# Patient Record
Sex: MALE
[De-identification: names, ages, dates, MRNs, and addresses within clinical notes are randomized per-mention and may not be internally consistent; named-entity substitution may affect disease eponyms.]

---

## 2017-03-19 ENCOUNTER — HOSPITAL ENCOUNTER (EMERGENCY)
Dept: HOSPITAL 31 - C.ER | Age: 32
LOS: 1 days | Discharge: HOME | End: 2017-03-20
Payer: SELF-PAY

## 2017-03-19 VITALS — RESPIRATION RATE: 18 BRPM | TEMPERATURE: 98.2 F

## 2017-03-19 DIAGNOSIS — F19.10: Primary | ICD-10-CM

## 2017-03-19 PROCEDURE — 99285 EMERGENCY DEPT VISIT HI MDM: CPT

## 2017-03-19 PROCEDURE — 85025 COMPLETE CBC W/AUTO DIFF WBC: CPT

## 2017-03-19 PROCEDURE — 81001 URINALYSIS AUTO W/SCOPE: CPT

## 2017-03-19 PROCEDURE — 80053 COMPREHEN METABOLIC PANEL: CPT

## 2017-03-20 VITALS — OXYGEN SATURATION: 95 % | HEART RATE: 73 BPM | DIASTOLIC BLOOD PRESSURE: 77 MMHG | SYSTOLIC BLOOD PRESSURE: 121 MMHG

## 2017-03-20 LAB
ALBUMIN/GLOB SERPL: 1.2 {RATIO} (ref 1–2.1)
ALP SERPL-CCNC: 65 U/L (ref 38–126)
ALT SERPL-CCNC: 23 U/L (ref 21–72)
AST SERPL-CCNC: 27 U/L (ref 17–59)
BASOPHILS # BLD AUTO: 0.4 K/UL (ref 0–0.2)
BASOPHILS NFR BLD: 2.5 % (ref 0–2)
BILIRUB SERPL-MCNC: 0.5 MG/DL (ref 0.2–1.3)
BILIRUB UR-MCNC: NEGATIVE MG/DL
BUN SERPL-MCNC: 16 MG/DL (ref 9–20)
CALCIUM SERPL-MCNC: 8.4 MG/DL (ref 8.6–10.4)
CHLORIDE SERPL-SCNC: 94 MMOL/L (ref 98–107)
CO2 SERPL-SCNC: 26 MMOL/L (ref 22–30)
EOSINOPHIL # BLD AUTO: 0.1 K/UL (ref 0–0.7)
EOSINOPHIL NFR BLD: 0.6 % (ref 0–4)
EOSINOPHIL NFR BLD: 1 % (ref 0–4)
ERYTHROCYTE [DISTWIDTH] IN BLOOD BY AUTOMATED COUNT: 13.1 % (ref 11.5–14.5)
ETHANOL SERPL-MCNC: < 10 MG/DL (ref 0–10)
GLOBULIN SER-MCNC: 3.4 GM/DL (ref 2.2–3.9)
GLUCOSE SERPL-MCNC: 111 MG/DL (ref 75–110)
GLUCOSE UR STRIP-MCNC: NORMAL MG/DL
HCT VFR BLD CALC: 37.4 % (ref 35–51)
KETONES UR STRIP-MCNC: (no result) MG/DL
LEUKOCYTE ESTERASE UR-ACNC: (no result) LEU/UL
LYMPHOCYTES # BLD AUTO: 0.8 K/UL (ref 1–4.3)
LYMPHOCYTES NFR BLD AUTO: 5.6 % (ref 20–40)
MCH RBC QN AUTO: 30.5 PG (ref 27–31)
MCHC RBC AUTO-ENTMCNC: 34.5 G/DL (ref 33–37)
MCV RBC AUTO: 88.4 FL (ref 80–94)
MONOCYTES # BLD: 1.4 K/UL (ref 0–0.8)
MONOCYTES NFR BLD: 9 % (ref 0–10)
NEUTROPHILS NFR BLD AUTO: 77 % (ref 50–75)
NRBC BLD AUTO-RTO: 0.1 % (ref 0–2)
PH UR STRIP: 6 [PH] (ref 5–8)
PLATELET # BLD: 249 K/UL (ref 130–400)
PMV BLD AUTO: 8 FL (ref 7.2–11.7)
POTASSIUM SERPL-SCNC: 3.5 MMOL/L (ref 3.6–5.2)
PROT SERPL-MCNC: 7.6 G/DL (ref 6.3–8.3)
PROT UR STRIP-MCNC: NEGATIVE MG/DL
RBC # UR STRIP: NEGATIVE /UL
RBC #/AREA URNS HPF: 2 /HPF (ref 0–3)
SODIUM SERPL-SCNC: 135 MMOL/L (ref 132–148)
SP GR UR STRIP: 1.01 (ref 1–1.03)
TOTAL CELLS COUNTED BLD: 100
UROBILINOGEN UR-MCNC: NORMAL MG/DL (ref 0.2–1)
WBC # BLD AUTO: 15 K/UL (ref 4.8–10.8)
WBC #/AREA URNS HPF: 3 /HPF (ref 0–5)

## 2017-12-20 ENCOUNTER — EMERGENCY (EMERGENCY)
Facility: HOSPITAL | Age: 32
LOS: 1 days | Discharge: HOME | End: 2017-12-20
Attending: EMERGENCY MEDICINE | Admitting: EMERGENCY MEDICINE
Payer: MEDICAID

## 2017-12-20 VITALS
TEMPERATURE: 99 F | DIASTOLIC BLOOD PRESSURE: 81 MMHG | RESPIRATION RATE: 20 BRPM | SYSTOLIC BLOOD PRESSURE: 118 MMHG | OXYGEN SATURATION: 100 % | HEART RATE: 130 BPM

## 2017-12-20 DIAGNOSIS — Z59.0 HOMELESSNESS: ICD-10-CM

## 2017-12-20 DIAGNOSIS — M25.562 PAIN IN LEFT KNEE: ICD-10-CM
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
## 2017-12-20 DIAGNOSIS — R05 COUGH: ICD-10-CM

## 2017-12-20 DIAGNOSIS — S82.142A DISPLACED BICONDYLAR FRACTURE OF LEFT TIBIA, INITIAL ENCOUNTER FOR CLOSED FRACTURE: ICD-10-CM
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
## 2017-12-20 DIAGNOSIS — R07.89 OTHER CHEST PAIN: ICD-10-CM
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
## 2017-12-20 DIAGNOSIS — F17.210 NICOTINE DEPENDENCE, CIGARETTES, UNCOMPLICATED: ICD-10-CM
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
## 2017-12-20 LAB
ALBUMIN SERPL ELPH-MCNC: 3.8 G/DL — SIGNIFICANT CHANGE UP (ref 3.4–5)
ALP SERPL-CCNC: 74 U/L — SIGNIFICANT CHANGE UP (ref 40–120)
ALT FLD-CCNC: 18 U/L — SIGNIFICANT CHANGE UP (ref 12–42)
ANION GAP SERPL CALC-SCNC: 11 MMOL/L — SIGNIFICANT CHANGE UP (ref 9–16)
AST SERPL-CCNC: 43 U/L — HIGH (ref 15–37)
BASOPHILS NFR BLD AUTO: 0.3 % — SIGNIFICANT CHANGE UP (ref 0–2)
BILIRUB SERPL-MCNC: 0.7 MG/DL — SIGNIFICANT CHANGE UP (ref 0.2–1.2)
BUN SERPL-MCNC: 15 MG/DL — SIGNIFICANT CHANGE UP (ref 7–23)
CALCIUM SERPL-MCNC: 8.8 MG/DL — SIGNIFICANT CHANGE UP (ref 8.5–10.5)
CHLORIDE SERPL-SCNC: 102 MMOL/L — SIGNIFICANT CHANGE UP (ref 96–108)
CO2 SERPL-SCNC: 22 MMOL/L — SIGNIFICANT CHANGE UP (ref 22–31)
CREAT SERPL-MCNC: 1.03 MG/DL — SIGNIFICANT CHANGE UP (ref 0.5–1.3)
EOSINOPHIL NFR BLD AUTO: 0.3 % — SIGNIFICANT CHANGE UP (ref 0–6)
GLUCOSE SERPL-MCNC: 103 MG/DL — HIGH (ref 70–99)
HCT VFR BLD CALC: 38.4 % — LOW (ref 39–50)
HGB BLD-MCNC: 13.4 G/DL — SIGNIFICANT CHANGE UP (ref 13–17)
HIV 1 & 2 AB SERPL IA.RAPID: SIGNIFICANT CHANGE UP
IMM GRANULOCYTES NFR BLD AUTO: 0.3 % — SIGNIFICANT CHANGE UP (ref 0–1.5)
LIDOCAIN IGE QN: 88 U/L — SIGNIFICANT CHANGE UP (ref 73–393)
LYMPHOCYTES # BLD AUTO: 13.8 % — SIGNIFICANT CHANGE UP (ref 13–44)
MCHC RBC-ENTMCNC: 30 PG — SIGNIFICANT CHANGE UP (ref 27–34)
MCHC RBC-ENTMCNC: 34.9 G/DL — SIGNIFICANT CHANGE UP (ref 32–36)
MCV RBC AUTO: 86.1 FL — SIGNIFICANT CHANGE UP (ref 80–100)
MONOCYTES NFR BLD AUTO: 14.5 % — HIGH (ref 2–14)
NEUTROPHILS NFR BLD AUTO: 70.8 % — SIGNIFICANT CHANGE UP (ref 43–77)
PLATELET # BLD AUTO: 356 K/UL — SIGNIFICANT CHANGE UP (ref 150–400)
POTASSIUM SERPL-MCNC: 3.5 MMOL/L — SIGNIFICANT CHANGE UP (ref 3.5–5.3)
POTASSIUM SERPL-SCNC: 3.5 MMOL/L — SIGNIFICANT CHANGE UP (ref 3.5–5.3)
PROT SERPL-MCNC: 7.7 G/DL — SIGNIFICANT CHANGE UP (ref 6.4–8.2)
RBC # BLD: 4.46 M/UL — SIGNIFICANT CHANGE UP (ref 4.2–5.8)
RBC # FLD: 12.7 % — SIGNIFICANT CHANGE UP (ref 10.3–16.9)
SODIUM SERPL-SCNC: 135 MMOL/L — SIGNIFICANT CHANGE UP (ref 132–145)
WBC # BLD: 20.7 K/UL — HIGH (ref 3.8–10.5)
WBC # FLD AUTO: 20.7 K/UL — HIGH (ref 3.8–10.5)

## 2017-12-20 PROCEDURE — 27530 TREAT KNEE FRACTURE: CPT | Mod: 54

## 2017-12-20 PROCEDURE — 73700 CT LOWER EXTREMITY W/O DYE: CPT | Mod: 26,LT

## 2017-12-20 PROCEDURE — 73562 X-RAY EXAM OF KNEE 3: CPT | Mod: 26,LT

## 2017-12-20 PROCEDURE — 99285 EMERGENCY DEPT VISIT HI MDM: CPT | Mod: 25

## 2017-12-20 PROCEDURE — 70450 CT HEAD/BRAIN W/O DYE: CPT | Mod: 26

## 2017-12-20 PROCEDURE — 71020: CPT | Mod: 26

## 2017-12-20 PROCEDURE — 99285 EMERGENCY DEPT VISIT HI MDM: CPT

## 2017-12-20 PROCEDURE — 74176 CT ABD & PELVIS W/O CONTRAST: CPT | Mod: 26

## 2017-12-20 RX ORDER — IBUPROFEN 200 MG
600 TABLET ORAL ONCE
Qty: 0 | Refills: 0 | Status: COMPLETED | OUTPATIENT
Start: 2017-12-20 | End: 2017-12-20

## 2017-12-20 RX ORDER — SODIUM CHLORIDE 9 MG/ML
1000 INJECTION INTRAMUSCULAR; INTRAVENOUS; SUBCUTANEOUS ONCE
Qty: 0 | Refills: 0 | Status: COMPLETED | OUTPATIENT
Start: 2017-12-20 | End: 2017-12-20

## 2017-12-20 RX ORDER — ACETAMINOPHEN 500 MG
2 TABLET ORAL
Qty: 60 | Refills: 0 | OUTPATIENT
Start: 2017-12-20

## 2017-12-20 RX ORDER — IBUPROFEN 200 MG
1 TABLET ORAL
Qty: 30 | Refills: 0 | OUTPATIENT
Start: 2017-12-20

## 2017-12-20 RX ORDER — ACETAMINOPHEN 500 MG
975 TABLET ORAL ONCE
Qty: 0 | Refills: 0 | Status: COMPLETED | OUTPATIENT
Start: 2017-12-20 | End: 2017-12-20

## 2017-12-20 RX ADMIN — Medication 600 MILLIGRAM(S): at 15:28

## 2017-12-20 RX ADMIN — SODIUM CHLORIDE 1000 MILLILITER(S): 9 INJECTION INTRAMUSCULAR; INTRAVENOUS; SUBCUTANEOUS at 17:13

## 2017-12-20 RX ADMIN — Medication 600 MILLIGRAM(S): at 16:42

## 2017-12-20 RX ADMIN — Medication 975 MILLIGRAM(S): at 15:28

## 2017-12-20 SDOH — ECONOMIC STABILITY - HOUSING INSECURITY: HOMELESSNESS: Z59.0

## 2017-12-20 NOTE — ED ADULT NURSE NOTE - CHPI ED SYMPTOMS NEG
no confusion/no deformity/no loss of consciousness/no weakness/no numbness/no vomiting/no tingling/no abrasion/no bleeding/no fever

## 2017-12-20 NOTE — CONSULT NOTE ADULT - NEGATIVE NEUROLOGICAL SYMPTOMS
no tremors/no loss of consciousness/no focal seizures/no syncope/no weakness/no paresthesias/no generalized seizures/no headache/no vertigo

## 2017-12-20 NOTE — ED PROVIDER NOTE - MUSCULOSKELETAL, MLM
Spine appears normal, range of motion is not limited, no ecchymosis on chest/back, + ant L knee ttp, no swelling, full ROM
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
## 2017-12-20 NOTE — ED PROVIDER NOTE - CARDIAC, MLM
Normal rate, regular rhythm.  Heart sounds S1, S2.  No murmurs, rubs or gallops. No focal chest wall ttp or crepitus

## 2017-12-20 NOTE — ED PROVIDER NOTE - PSYCHIATRIC, MLM
Alert and oriented to person, place, time/situation. normal mood and affect. Alert and oriented to person, place, time/situation. Sleepy but rousable fully.
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
## 2017-12-20 NOTE — ED PROVIDER NOTE - CARE PLAN
Principal Discharge DX:	Multiple contusions Principal Discharge DX:	Tibial plateau fracture, left, closed, initial encounter
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
## 2017-12-20 NOTE — ED PROVIDER NOTE - CONSTITUTIONAL, MLM
normal... Well appearing, well nourished, awake, alert, oriented to person, place, time/situation and in no apparent distress. ++ sleepy, wakes up then curls back up in chair
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
## 2017-12-21 VITALS
RESPIRATION RATE: 16 BRPM | TEMPERATURE: 98 F | DIASTOLIC BLOOD PRESSURE: 72 MMHG | HEART RATE: 84 BPM | OXYGEN SATURATION: 97 % | SYSTOLIC BLOOD PRESSURE: 118 MMHG

## 2017-12-21 NOTE — ED ADULT NURSE REASSESSMENT NOTE - NS ED NURSE REASSESS COMMENT FT1
Pt noted to be sleeping at this time. Transportation service called by [facility] for  estimated time of arrival. Per [facility], transportation service need to be redone as service lost the information regarding transport.
Pt with discharge orders. Awaiting arrival of transportation.

## 2017-12-23 ENCOUNTER — HOSPITAL ENCOUNTER (EMERGENCY)
Dept: HOSPITAL 14 - H.ER | Age: 32
Discharge: HOME | End: 2017-12-23
Payer: MEDICAID

## 2017-12-23 ENCOUNTER — HOSPITAL ENCOUNTER (INPATIENT)
Dept: HOSPITAL 31 - C.ER | Age: 32
LOS: 6 days | Discharge: HOME | DRG: 430 | End: 2017-12-29
Attending: PSYCHIATRY & NEUROLOGY | Admitting: PSYCHIATRY & NEUROLOGY
Payer: MEDICAID

## 2017-12-23 VITALS
RESPIRATION RATE: 16 BRPM | SYSTOLIC BLOOD PRESSURE: 125 MMHG | DIASTOLIC BLOOD PRESSURE: 90 MMHG | OXYGEN SATURATION: 98 % | HEART RATE: 84 BPM

## 2017-12-23 VITALS — TEMPERATURE: 98.2 F

## 2017-12-23 DIAGNOSIS — F17.210: ICD-10-CM

## 2017-12-23 DIAGNOSIS — R45.851: ICD-10-CM

## 2017-12-23 DIAGNOSIS — F41.9: ICD-10-CM

## 2017-12-23 DIAGNOSIS — F32.9: Primary | ICD-10-CM

## 2017-12-23 DIAGNOSIS — F11.20: ICD-10-CM

## 2017-12-23 DIAGNOSIS — F14.20: ICD-10-CM

## 2017-12-23 DIAGNOSIS — F33.3: Primary | ICD-10-CM

## 2017-12-23 LAB
ALBUMIN/GLOB SERPL: 1.2 {RATIO} (ref 1–2.1)
ALP SERPL-CCNC: 49 U/L (ref 38–126)
ALT SERPL-CCNC: 22 U/L (ref 21–72)
AST SERPL-CCNC: 41 U/L (ref 17–59)
BACTERIA #/AREA URNS HPF: (no result) /[HPF]
BASOPHILS # BLD AUTO: 0 K/UL (ref 0–0.2)
BASOPHILS NFR BLD: 0.4 % (ref 0–2)
BILIRUB SERPL-MCNC: 0.7 MG/DL (ref 0.2–1.3)
BILIRUB UR-MCNC: NEGATIVE MG/DL
BUN SERPL-MCNC: 9 MG/DL (ref 9–20)
CALCIUM SERPL-MCNC: 8.2 MG/DL (ref 8.6–10.4)
CHLORIDE SERPL-SCNC: 98 MMOL/L (ref 98–107)
CO2 SERPL-SCNC: 31 MMOL/L (ref 22–30)
EOSINOPHIL # BLD AUTO: 0.1 K/UL (ref 0–0.7)
EOSINOPHIL NFR BLD: 1.3 % (ref 0–4)
ERYTHROCYTE [DISTWIDTH] IN BLOOD BY AUTOMATED COUNT: 13 % (ref 11.5–14.5)
ETHANOL SERPL-MCNC: < 10 MG/DL (ref 0–10)
GLOBULIN SER-MCNC: 3.1 GM/DL (ref 2.2–3.9)
GLUCOSE SERPL-MCNC: 128 MG/DL (ref 75–110)
GLUCOSE UR STRIP-MCNC: NORMAL MG/DL
HCT VFR BLD CALC: 33.6 % (ref 35–51)
KETONES UR STRIP-MCNC: (no result) MG/DL
LEUKOCYTE ESTERASE UR-ACNC: (no result) LEU/UL
LYMPHOCYTES # BLD AUTO: 1.7 K/UL (ref 1–4.3)
LYMPHOCYTES NFR BLD AUTO: 19.8 % (ref 20–40)
MCH RBC QN AUTO: 29.8 PG (ref 27–31)
MCHC RBC AUTO-ENTMCNC: 34.3 G/DL (ref 33–37)
MCV RBC AUTO: 87 FL (ref 80–94)
MONOCYTES # BLD: 1 K/UL (ref 0–0.8)
MONOCYTES NFR BLD: 12 % (ref 0–10)
NRBC BLD AUTO-RTO: 0 % (ref 0–2)
PH UR STRIP: 5 [PH] (ref 5–8)
PLATELET # BLD: 322 K/UL (ref 130–400)
PMV BLD AUTO: 7.7 FL (ref 7.2–11.7)
POTASSIUM SERPL-SCNC: 3.2 MMOL/L (ref 3.6–5.2)
PROT SERPL-MCNC: 6.6 G/DL (ref 6.3–8.3)
PROT UR STRIP-MCNC: (no result) MG/DL
RBC # UR STRIP: (no result) /UL
RBC #/AREA URNS HPF: 10 /HPF (ref 0–3)
SODIUM SERPL-SCNC: 136 MMOL/L (ref 132–148)
SP GR UR STRIP: 1.03 (ref 1–1.03)
UROBILINOGEN UR-MCNC: NORMAL MG/DL (ref 0.2–1)
WBC # BLD AUTO: 8.4 K/UL (ref 4.8–10.8)
WBC #/AREA URNS HPF: 10 /HPF (ref 0–5)

## 2017-12-23 RX ADMIN — POTASSIUM CHLORIDE SCH MEQ: 20 TABLET, EXTENDED RELEASE ORAL at 21:23

## 2017-12-23 NOTE — ED PDOC
HPI: Psych/Substance Abuse


Time Seen by Provider: 12/23/17 15:31


Chief Complaint (Nursing): Psychiatric Evaluation


Chief Complaint (Provider): Crisis eval


History Per: Patient


Additional Complaint(s): 





31 yo male, reports a PMH of depression and anxiety, presents to ED calm and 

cooperative at this time stating he is feeling depressed. Patient also hearing 

voices, but unsure what they are saying. Patient states he feel like people are 

after him, and reports that he has not been able to eat or sleep because people 

have been "messing" with him. Reports intermittent suicidal ideations. Patient 

hasn't taken his psych medications (unknown names) for "a few years." 


Pt denies any physical complaints at this time.


Denies any homicidal ideations. 





Past Medical History


Reviewed: Nursing Documentation, Vital Signs


Vital Signs: 





 Last Vital Signs











Temp  9832 F H  12/23/17 15:40


 


Pulse  84   12/23/17 15:40


 


Resp  16   12/23/17 15:40


 


BP  125/90   12/23/17 15:40


 


Pulse Ox  98   12/23/17 15:40














- Medical History


PMH: Anxiety, Back Problems, Depression


   Denies: Diabetes, Hepatitis, HIV, HTN, Chronic Kidney Disease, Seizures, 

Sexually Transmitted Disease





- Family History


Family History: States: Unknown Family Hx





- Living Arrangements


Living Arrangements: Other





- Social History


Current smoker - smoking cessation education provided: No


Alcohol: Social


Drugs: Cannabis, Cocaine, Opiates, Prescription medications





- Immunization History


Hx Tetanus Toxoid Vaccination: No


Hx Influenza Vaccination: No


Hx Pneumococcal Vaccination: No





- Home Medications


Home Medications: 


 Ambulatory Orders











 Medication  Instructions  Recorded


 


Polyethylene Glycol 3350 [Miralax] 17 gm PO DAILY #270 ml 02/16/17














- Allergies


Allergies/Adverse Reactions: 


 Allergies











Allergy/AdvReac Type Severity Reaction Status Date / Time


 


No Known Allergies Allergy   Verified 12/23/17 15:40














Review of Systems


ROS Statement: Except As Marked, All Systems Reviewed And Found Negative


Psych: Positive for: Depression, Suicidal ideation, Other (auditory 

hallucinations)





Physical Exam





- Reviewed


Nursing Documentation Reviewed: Yes


Vital Signs Reviewed: Yes





- Physical Exam


Appears: Positive for: Well, Non-toxic, No Acute Distress


Head Exam: Positive for: ATRAUMATIC, NORMAL INSPECTION, NORMOCEPHALIC


Skin: Positive for: Normal Color, Warm, DRY


Eye Exam: Positive for: EOMI, Normal appearance, PERRL


ENT: Positive for: Normal ENT Inspection


Neck: Positive for: Normal, Painless ROM


Cardiovascular/Chest: Positive for: Regular Rate, Rhythm


Respiratory: Positive for: CNT, Normal Breath Sounds


Gastrointestinal/Abdominal: Positive for: Normal Exam, Bowel Sounds, Soft


Back: Positive for: Normal Inspection


Extremity: Positive for: Normal ROM


Neurologic/Psych: Positive for: Alert, Oriented





- ECG


O2 Sat by Pulse Oximetry: 98





Medical Decision Making


Medical Decision Making: 








No medical complaints offrred. no medical intervention needed at this time. 








Crisis presented to see and evaluate Pt upon arrival. see notes. 


As per Dr. Munguia, Pt deemed cleared for discharge. Concern for malingering/bed 

seeking behavior. 





Upon going to speak to Pt at discharge, Pt became upset and screaming at writer 

stating, "I can't leave, I need to stay here. How can you send someone out on 

Xmas. I need help, I want to kill myself if I get discharged."








Crisis team presented once again to bedside to speak with Pt. See notes. Pt to 

be discharged.





Security called to bedside to assist in escorting Pt out.





Disposition





- Clinical Impression


Clinical Impression: 


 Depression








- Patient ED Disposition


Is Patient to be Admitted: No





- Disposition


Disposition: Routine/Home


Disposition Time: 17:48


Condition: STABLE


Instructions:  Depression (ED)


Forms:  [facility] (English)





- POA


Present On Arrival: None

## 2017-12-24 RX ADMIN — POTASSIUM CHLORIDE SCH MEQ: 20 TABLET, EXTENDED RELEASE ORAL at 09:35

## 2017-12-26 VITALS — OXYGEN SATURATION: 96 %

## 2017-12-26 PROBLEM — Z00.00 ENCOUNTER FOR PREVENTIVE HEALTH EXAMINATION: Status: ACTIVE | Noted: 2017-12-26

## 2017-12-29 ENCOUNTER — APPOINTMENT (OUTPATIENT)
Dept: ORTHOPEDIC SURGERY | Facility: CLINIC | Age: 32
End: 2017-12-29
Payer: MEDICAID

## 2017-12-29 ENCOUNTER — APPOINTMENT (OUTPATIENT)
Dept: RADIOLOGY | Facility: CLINIC | Age: 32
End: 2017-12-29

## 2017-12-29 VITALS — WEIGHT: 180 LBS | BODY MASS INDEX: 23.86 KG/M2 | HEIGHT: 73 IN

## 2017-12-29 VITALS
SYSTOLIC BLOOD PRESSURE: 99 MMHG | RESPIRATION RATE: 19 BRPM | HEART RATE: 70 BPM | DIASTOLIC BLOOD PRESSURE: 59 MMHG | TEMPERATURE: 98.3 F

## 2017-12-29 DIAGNOSIS — F41.9 ANXIETY DISORDER, UNSPECIFIED: ICD-10-CM

## 2017-12-29 DIAGNOSIS — F17.200 NICOTINE DEPENDENCE, UNSPECIFIED, UNCOMPLICATED: ICD-10-CM

## 2017-12-29 DIAGNOSIS — Z86.59 PERSONAL HISTORY OF OTHER MENTAL AND BEHAVIORAL DISORDERS: ICD-10-CM

## 2017-12-29 DIAGNOSIS — S82.142A DISPLACED BICONDYLAR FRACTURE OF LEFT TIBIA, INITIAL ENCOUNTER FOR CLOSED FRACTURE: ICD-10-CM

## 2017-12-29 DIAGNOSIS — Z87.898 PERSONAL HISTORY OF OTHER SPECIFIED CONDITIONS: ICD-10-CM

## 2017-12-29 PROCEDURE — 27530 TREAT KNEE FRACTURE: CPT

## 2017-12-29 PROCEDURE — 99214 OFFICE O/P EST MOD 30 MIN: CPT | Mod: 25

## 2018-01-02 ENCOUNTER — HOSPITAL ENCOUNTER (INPATIENT)
Dept: HOSPITAL 74 - YASAS | Age: 33
LOS: 13 days | Discharge: HOME | DRG: 772 | End: 2018-01-15
Attending: PSYCHIATRY & NEUROLOGY | Admitting: PSYCHIATRY & NEUROLOGY
Payer: COMMERCIAL

## 2018-01-02 VITALS — BODY MASS INDEX: 23.7 KG/M2

## 2018-01-02 DIAGNOSIS — Z99.89: ICD-10-CM

## 2018-01-02 DIAGNOSIS — F90.9: ICD-10-CM

## 2018-01-02 DIAGNOSIS — S82.202S: ICD-10-CM

## 2018-01-02 DIAGNOSIS — Z59.0: ICD-10-CM

## 2018-01-02 DIAGNOSIS — R00.0: ICD-10-CM

## 2018-01-02 DIAGNOSIS — F33.9: ICD-10-CM

## 2018-01-02 DIAGNOSIS — Z87.891: ICD-10-CM

## 2018-01-02 DIAGNOSIS — F19.280: ICD-10-CM

## 2018-01-02 DIAGNOSIS — F19.282: ICD-10-CM

## 2018-01-02 DIAGNOSIS — R26.2: ICD-10-CM

## 2018-01-02 DIAGNOSIS — F11.20: Primary | ICD-10-CM
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
## 2018-01-02 PROBLEM — F41.9 ANXIETY: Status: RESOLVED | Noted: 2017-12-29 | Resolved: 2018-01-02

## 2018-01-02 PROBLEM — F17.200 CURRENT SMOKER: Status: ACTIVE | Noted: 2017-12-29

## 2018-01-02 PROBLEM — Z86.59 HISTORY OF ATTENTION DEFICIT DISORDER: Status: RESOLVED | Noted: 2017-12-29 | Resolved: 2018-01-02

## 2018-01-02 PROBLEM — Z87.898 HISTORY OF INSOMNIA: Status: RESOLVED | Noted: 2017-12-29 | Resolved: 2018-01-02

## 2018-01-02 PROCEDURE — HZ42ZZZ GROUP COUNSELING FOR SUBSTANCE ABUSE TREATMENT, COGNITIVE-BEHAVIORAL: ICD-10-PCS | Performed by: PSYCHIATRY & NEUROLOGY

## 2018-01-02 RX ORDER — CLONAZEPAM 2 MG/1
TABLET ORAL
Refills: 0 | Status: ACTIVE | COMMUNITY

## 2018-01-02 SDOH — ECONOMIC STABILITY - HOUSING INSECURITY: HOMELESSNESS: Z59.0

## 2018-01-02 NOTE — HP
Admission ROS [facility]





- hospitals


Chief Complaint: 





I WANT TO GO TO REHAB


Allergies/Adverse Reactions: 


 Allergies











Allergy/AdvReac Type Severity Reaction Status Date / Time


 


No Known Allergies Allergy   Verified 18 23:24











History of Present Illness: 





32 YEARS OLD MALE WITH LONG HISTORY OF HEROIN DEPENDENCE LEFT LEG FX 17 

AMBULATE WITH CRUTCHES IS ADMITTED TO REHAB


Exam Limitations: No Limitations





- Ebola screening


Have you traveled outside of the country in the last 21 days: No


Have you had contact with anyone from an Ebola affected area: No


Have you been sick,other than usual withdrawal symptoms: No


Do you have a fever: No





- Review of Systems


Constitutional: Loss of Appetite, Unintentional Wgt. Loss, Unexplained wgt Loss


EENT: reports: Dental Problems (UPPER TEETH MISSING)


Respiratory: reports: No Symptoms reported


Cardiac: reports: No Symptoms Reported


GI: reports: No Symptoms Reported, Poor Appetite


: reports: No Symptoms Reported


Musculoskeletal: reports: Back Pain


Integumentary: reports: No Symptoms Reported


Neuro: reports: No Symptoms reported


Endocrine: reports: No Symptoms Reported


Hematology: reports: No Symptoms Reported


Psychiatric: reports: Judgement Intact, Orientated x3, Anxious, Depressed


Other Systems: Reviewed and Negative





Patient History





- Patient Medical History


Hx Anemia: No


Hx Asthma: No


Hx Chronic Obstructive Pulmonary Disease (COPD): No


Hx Cancer: No


Hx Cardiac Disorders: No


Hx Congestive Heart Failure: No


Hx Hypertension: No


Hx Hypercholesterolemia: No


Hx Pacemaker: No


HX Cerebrovascular Accident: No


Hx Seizures: No


Hx Dementia: No


Hx Diabetes: No


Hx Gastrointestinal Disorders: No


Hx Liver Disease: No


Hx Genitourinary Disorders: No


Hx Sexually Transmitted Disorders: No


Hx Renal Disease (ESRD): No


Hx Thyroid Disease: No


Hx Human Immunodeficiency Virus (HIV): No


Hx Hepatitis C: No


Hx Depression: Yes


Hx Suicide Attempt: No


Hx Bipolar Disorder: No


Hx Schizophrenia: No





- Patient Surgical History


Past Surgical History: No





- PPD History


Previous Implant?: Yes


Documented Results: Negative w/o proof


Implanted On Prior [facility] Admission?: No


PPD to be Administered?: Yes





- Smoking Cessation


Smoking history: Former smoker


Have you smoked in the past 12 months: No


Hx Chewing Tobacco Use: No


Initiated information on smoking cessation: No





- Substance & Tx. History


Hx Alcohol Use: No


Hx Substance Use: Yes


Substance Use Type: Opiates


Hx Substance Use Treatment: Yes (17)





- Substances Abused


  ** Heroin


Route: Inhalation


Frequency: Daily


Amount used: 5 BAGS


Age of first use: 31


Date of Last Use: 17





Family Disease History





- Family Disease History


Family Disease History: Other: Father ( HIV), Mother ( HIV)





Admission Physical Exam [facility]





- Vital Signs


Vital Signs: 


 Vital Signs - 24 hr











  18





  22:52


 


Temperature 98.6 F


 


Pulse Rate 91 H


 


Respiratory 20





Rate 


 


Blood Pressure 125/81














- Physical


General Appearance: Yes: No Apparent Distress, Appropriately Dressed, Thin


HEENTM: Yes: Hearing grossly Normal, Normal ENT Inspection, Normocephalic, 

Normal Voice


Respiratory: Yes: Chest Non-Tender, Lungs Clear, Normal Breath Sounds, No 

Respiratory Distress, No Accessory Muscle Use


Neck: Yes: Supple, Trachea in good position


Breast: Yes: Breasts Symetrical


Cardiology: Yes: Regular Rhythm, S1, S2, Tachycardia


Abdominal: Yes: Normal Bowel Sounds, Non Tender, Soft


Genitourinary: Yes: Within Normal Limits


Back: Yes: Normal Inspection


Musculoskeletal: Yes: full range of Motion, Gait Steady, Muscle Pain (LEFT LEG)


Extremities: Yes: Non-Tender


Neurological: Yes: Fully Oriented, Alert, Normal Response, Depressed Affect


Integumentary: Yes: Warm


Lymphatic: Yes: Within Normal Limits





- Diagnostic


(1) Opioid dependence with withdrawal


Current Visit: Yes   Status: Acute   





(2) Weight loss


Current Visit: Yes   Status: Acute   





(3) Left tibial fracture


Current Visit: Yes   Status: Acute   


Qualifiers: 


   Encounter type: sequela   Tibia location: shaft   Fracture type: closed   

Fracture morphology: unspecified fracture morphology   Qualified Code(s): 

S82.202S - Unspecified fracture of shaft of left tibia, sequela   


Comment: PATIENT STATES THAT FRACTURE LEFT LEG 17 TREATED WITH BRACE AND 

CRUTCHES AMBULATION


RETURN TO ORTHOPEDIA IN TWO - THREE WEEKS   





Cleared for Admission [facility]





- Detox or Rehab


[facility] Level of Care: Observation Bed


Detox Regimen/Protocol: Not Applicable


Claeared for Rehab Admission: Yes





[facility] Breath Alcohol Content


Breath Alcohol Content: 0





Urine Drug Screen





- Results


Drug Screen Negative: No


Urine Drug Screen Results: THC-Marijuana





Inpatient Rehab Admission





- Initial Determination


Are CD services needed?: Yes


Free of communicable disease: Yes


Not in need of hospitalization: Yes





- Rehab Admission Criteria


Previous failed treatment: Yes


Poor recovery environment: Yes


Comorbidities: Yes


Lacks judgement: No


Patient is meeting Inpatient Rehab admission criteria:: Yes

## 2018-01-03 LAB
ALBUMIN SERPL-MCNC: 3.2 G/DL (ref 3.4–5)
ALP SERPL-CCNC: 107 U/L (ref 45–117)
ALT SERPL-CCNC: 22 U/L (ref 12–78)
ANION GAP SERPL CALC-SCNC: 7 MMOL/L (ref 8–16)
APPEARANCE UR: (no result)
AST SERPL-CCNC: 11 U/L (ref 15–37)
BILIRUB SERPL-MCNC: 0.4 MG/DL (ref 0.2–1)
BILIRUB UR STRIP.AUTO-MCNC: NEGATIVE MG/DL
BUN SERPL-MCNC: 15 MG/DL (ref 7–18)
CALCIUM SERPL-MCNC: 8.4 MG/DL (ref 8.5–10.1)
CHLORIDE SERPL-SCNC: 107 MMOL/L (ref 98–107)
CO2 SERPL-SCNC: 27 MMOL/L (ref 21–32)
COLOR UR: YELLOW
CREAT SERPL-MCNC: 0.7 MG/DL (ref 0.7–1.3)
DEPRECATED RDW RBC AUTO: 14 % (ref 11.9–15.9)
GLUCOSE SERPL-MCNC: 144 MG/DL (ref 74–106)
HCT VFR BLD CALC: 39.2 % (ref 35.4–49)
HGB BLD-MCNC: 12.9 GM/DL (ref 11.7–16.9)
KETONES UR QL STRIP: NEGATIVE
LEUKOCYTE ESTERASE UR QL STRIP.AUTO: (no result)
MCH RBC QN AUTO: 29.5 PG (ref 25.7–33.7)
MCHC RBC AUTO-ENTMCNC: 32.8 G/DL (ref 32–35.9)
MCV RBC: 90 FL (ref 80–96)
MUCOUS THREADS URNS QL MICRO: (no result)
NITRITE UR QL STRIP: NEGATIVE
PH UR: 5 [PH] (ref 5–8)
PLATELET # BLD AUTO: 403 K/MM3 (ref 134–434)
PMV BLD: 7.5 FL (ref 7.5–11.1)
POTASSIUM SERPLBLD-SCNC: 4 MMOL/L (ref 3.5–5.1)
PROT SERPL-MCNC: 6.7 G/DL (ref 6.4–8.2)
PROT UR QL STRIP: NEGATIVE
PROT UR QL STRIP: NEGATIVE
RBC # BLD AUTO: 4.36 M/MM3 (ref 4–5.6)
RBC # UR STRIP: NEGATIVE /UL
SODIUM SERPL-SCNC: 141 MMOL/L (ref 136–145)
SP GR UR: 1.03 (ref 1–1.03)
UROBILINOGEN UR STRIP-MCNC: NEGATIVE MG/DL (ref 0.2–1)
WBC # BLD AUTO: 9.2 K/MM3 (ref 4–10)

## 2018-01-03 RX ADMIN — ACETAMINOPHEN PRN MG: 325 TABLET ORAL at 02:27

## 2018-01-03 RX ADMIN — ACETAMINOPHEN PRN MG: 325 TABLET ORAL at 19:07

## 2018-01-03 RX ADMIN — ACETAMINOPHEN PRN MG: 325 TABLET ORAL at 10:18

## 2018-01-03 RX ADMIN — GABAPENTIN SCH MG: 100 CAPSULE ORAL at 14:47

## 2018-01-03 RX ADMIN — Medication SCH: at 22:11

## 2018-01-03 RX ADMIN — GABAPENTIN SCH MG: 100 CAPSULE ORAL at 06:42

## 2018-01-03 RX ADMIN — Medication SCH TAB: at 10:16

## 2018-01-03 RX ADMIN — HYDROXYZINE PAMOATE PRN MG: 50 CAPSULE ORAL at 15:55

## 2018-01-03 RX ADMIN — GABAPENTIN SCH: 100 CAPSULE ORAL at 22:10

## 2018-01-03 NOTE — EKG
Test Reason : 

Blood Pressure : ***/*** mmHG

Vent. Rate : 076 BPM     Atrial Rate : 076 BPM

   P-R Int : 134 ms          QRS Dur : 104 ms

    QT Int : 388 ms       P-R-T Axes : 045 101 056 degrees

   QTc Int : 436 ms

 

NORMAL SINUS RHYTHM

RIGHTWARD AXIS

BORDERLINE ECG

NO PREVIOUS ECGS AVAILABLE

Confirmed by COLBY JIN, DEIRDRE ([id number]) on 1/3/2018 1:32:15 PM

 

Referred By:             Confirmed By:DEIRDRE BOWMAN MD
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
## 2018-01-04 RX ADMIN — ACETAMINOPHEN PRN MG: 325 TABLET ORAL at 14:35

## 2018-01-04 RX ADMIN — GABAPENTIN SCH MG: 100 CAPSULE ORAL at 14:35

## 2018-01-04 RX ADMIN — Medication SCH TAB: at 10:28

## 2018-01-04 RX ADMIN — ACETAMINOPHEN PRN MG: 325 TABLET ORAL at 08:51

## 2018-01-04 RX ADMIN — HYDROXYZINE PAMOATE PRN MG: 50 CAPSULE ORAL at 21:24

## 2018-01-04 RX ADMIN — ACETAMINOPHEN PRN MG: 325 TABLET ORAL at 02:27

## 2018-01-04 RX ADMIN — GABAPENTIN SCH MG: 100 CAPSULE ORAL at 21:08

## 2018-01-04 RX ADMIN — IBUPROFEN PRN MG: 400 TABLET, FILM COATED ORAL at 19:40

## 2018-01-04 RX ADMIN — GABAPENTIN SCH MG: 100 CAPSULE ORAL at 06:58

## 2018-01-04 RX ADMIN — HYDROXYZINE PAMOATE PRN MG: 50 CAPSULE ORAL at 13:52

## 2018-01-04 RX ADMIN — Medication SCH MG: at 21:08

## 2018-01-05 RX ADMIN — Medication SCH MG: at 21:52

## 2018-01-05 RX ADMIN — GABAPENTIN SCH MG: 100 CAPSULE ORAL at 21:52

## 2018-01-05 RX ADMIN — ACETAMINOPHEN PRN MG: 325 TABLET ORAL at 21:52

## 2018-01-05 RX ADMIN — HYDROXYZINE PAMOATE PRN MG: 50 CAPSULE ORAL at 10:19

## 2018-01-05 RX ADMIN — GABAPENTIN SCH MG: 100 CAPSULE ORAL at 14:03

## 2018-01-05 RX ADMIN — IBUPROFEN PRN MG: 400 TABLET, FILM COATED ORAL at 17:41

## 2018-01-05 RX ADMIN — DOXEPIN HYDROCHLORIDE SCH MG: 50 CAPSULE ORAL at 21:52

## 2018-01-05 RX ADMIN — HYDROXYZINE PAMOATE PRN MG: 50 CAPSULE ORAL at 17:42

## 2018-01-05 RX ADMIN — ACETAMINOPHEN PRN MG: 325 TABLET ORAL at 04:08

## 2018-01-05 RX ADMIN — HYDROXYZINE PAMOATE PRN MG: 50 CAPSULE ORAL at 21:52

## 2018-01-05 RX ADMIN — Medication SCH TAB: at 10:18

## 2018-01-05 RX ADMIN — GABAPENTIN SCH MG: 100 CAPSULE ORAL at 06:36

## 2018-01-05 RX ADMIN — IBUPROFEN PRN MG: 400 TABLET, FILM COATED ORAL at 11:08

## 2018-01-05 NOTE — PN
Psychiatric Progress Note


Vital Signs: 


 Vital Signs











 Period  Temp  Pulse  Resp  BP Sys/Corenjo  Pulse Ox


 


 Last 24 Hr  97.4 F  98  16-18  114/85  











Date of Session: 01/05/18


Chief Complaint:: Insomnia


HPI: Patient addressing Opoid Dependence comorbid with ADHD, MDD, Substance-

induced anxiety Disorder and Substance-Induced Sleep Disorder


ROS: Left tibia fracture


Current Medications: 


Active Medications











Generic Name Dose Route Start Last Admin





  Trade Name Freq  PRN Reason Stop Dose Admin


 


Acetaminophen  650 mg  01/02/18 23:31  01/05/18 04:08





  Tylenol -  PO   650 mg





  Q4H PRN   Administration





  PAIN   


 


Al Hydroxide/Mg Hydroxide  30 ml  01/02/18 23:31  





  Mylanta Oral Suspension -  PO   





  Q6H PRN   





  DYSPEPSIA   


 


Doxepin HCl  50 mg  01/05/18 22:00  





  Sinequan -  PO   





  HS [facility]   


 


Eucalyptus/Menthol/Phenol/Sorbitol  1 each  01/02/18 23:31  





  Cepastat Lozenge -  MM   





  Q4H PRN   





  SORE THROAT   


 


Gabapentin  100 mg  01/03/18 06:00  01/05/18 06:36





  Neurontin -  PO   100 mg





  TID [facility]   Administration


 


Guaifenesin  10 ml  01/02/18 23:31  





  Robitussin Dm -  PO   





  Q6H PRN   





  COUGH   


 


Hydroxyzine Pamoate  50 mg  01/03/18 11:16  01/05/18 10:19





  Vistaril -  PO   50 mg





  Q4H PRN   Administration





  ANXIETY   


 


Ibuprofen  400 mg  01/02/18 23:31  01/05/18 11:08





  Motrin -  PO   400 mg





  Q6H PRN   Administration





  SEVERE PAIN   


 


Loperamide HCl  4 mg  01/02/18 23:31  





  Imodium -  PO   





  Q6H PRN   





  DIARRHEA   


 


Magnesium Citrate  300 ml  01/02/18 23:31  





  Citroma -  PO   





  Q48H PRN   





  CONSTIPATION   


 


Magnesium Hydroxide  30 ml  01/02/18 23:31  





  Milk Of Magnesia -  PO   





  DAILY PRN   





  CONSTIPATION   


 


Nicotine Polacrilex  2 mg  01/04/18 12:26  





  Nicorette Gum -  BUC   





  Q2H PRN   





  NICOTINE REPLACEMENT RX   


 


Prenatal Multivit/Folic Acid/Iron  1 tab  01/03/18 10:00  01/05/18 10:18





  Prenatal Vitamins (Sjr) -  PO   1 tab





  DAILY [facility]   Administration


 


Pseudoephedrine/Triprolidine  1 combo  01/02/18 23:31  





  Actifed -  PO   





  TID PRN   





  NASAL CONGESTION   


 


Thiamine HCl  100 mg  01/03/18 22:00  01/04/18 21:08





  Vitamin B1 -  PO   100 mg





  HS [facility]   Administration











Current Side Effect: No


Lab tests ordered: Yes


Lab tests reviewed: Yes


Provider note:: Patient reports experiencing difficulty to sleep. Told writer 

that he has been able to sleep well despite taking Vistaril at bedtime. Claims 

to have very good response with Ambien in  the past but had some type of side-

effects with Trazadone. Hypnotic as well as adverse-effects of Doxepin 

discussed with patient and he agreed to try it


Total face to face time:: 15





Mental Status Exam





- Mental Status Exam


Alert and Oriented to: Time, Place, Person


Cognitive Function: Fair


Patient Appearance: Well Groomed


Mood: Hopeful, Euthymic


Patient Behavior: Cooperative


Speech Pattern: Clear


Voice Loudness: Normal


Thought Process: Intact, Goal Oriented


Thought Disorder: Not Present


Hallucinations: Denies


Suicidal Ideation: Denies


Homicidal Ideation: Denies


Insight/Judgement: Fair


Sleep: Poorly


Muscle strength/Tone: Normal


Gait/Station: Normal





Psychiatric Treatment Plan





- Problem List


(1) Opioid dependence


Current Visit: Yes   





(2) ADHD (attention deficit hyperactivity disorder)


Current Visit: Yes   





(3) MDD (major depressive disorder)


Current Visit: Yes   





(4) Substance-induced anxiety disorder


Current Visit: Yes   





(5) Substance-induced sleep disorder


Current Visit: Yes   





(6) Left tibial fracture


Current Visit: Yes   


Initial treatment plan: 1) Start Doxepin 50 mg po HS.  2) Monitor progress

## 2018-01-06 RX ADMIN — HYDROCORTISONE SCH: 1 CREAM TOPICAL at 22:12

## 2018-01-06 RX ADMIN — IBUPROFEN PRN MG: 400 TABLET, FILM COATED ORAL at 14:05

## 2018-01-06 RX ADMIN — HYDROXYZINE PAMOATE PRN MG: 50 CAPSULE ORAL at 20:16

## 2018-01-06 RX ADMIN — DOXEPIN HYDROCHLORIDE SCH MG: 50 CAPSULE ORAL at 21:31

## 2018-01-06 RX ADMIN — GABAPENTIN SCH MG: 100 CAPSULE ORAL at 21:31

## 2018-01-06 RX ADMIN — Medication SCH TAB: at 10:03

## 2018-01-06 RX ADMIN — ACETAMINOPHEN PRN MG: 325 TABLET ORAL at 20:14

## 2018-01-06 RX ADMIN — GABAPENTIN SCH MG: 100 CAPSULE ORAL at 14:04

## 2018-01-06 RX ADMIN — Medication SCH MG: at 21:31

## 2018-01-06 RX ADMIN — GABAPENTIN SCH MG: 100 CAPSULE ORAL at 06:01

## 2018-01-06 RX ADMIN — ACETAMINOPHEN PRN MG: 325 TABLET ORAL at 06:02

## 2018-01-06 RX ADMIN — HYDROXYZINE PAMOATE PRN MG: 50 CAPSULE ORAL at 14:06

## 2018-01-07 RX ADMIN — GABAPENTIN SCH MG: 100 CAPSULE ORAL at 06:10

## 2018-01-07 RX ADMIN — GABAPENTIN SCH MG: 100 CAPSULE ORAL at 21:17

## 2018-01-07 RX ADMIN — HYDROXYZINE PAMOATE PRN MG: 50 CAPSULE ORAL at 20:25

## 2018-01-07 RX ADMIN — ACETAMINOPHEN PRN MG: 325 TABLET ORAL at 20:25

## 2018-01-07 RX ADMIN — HYDROXYZINE PAMOATE PRN MG: 50 CAPSULE ORAL at 14:25

## 2018-01-07 RX ADMIN — Medication SCH TAB: at 10:32

## 2018-01-07 RX ADMIN — Medication SCH MG: at 21:17

## 2018-01-07 RX ADMIN — ACETAMINOPHEN PRN MG: 325 TABLET ORAL at 04:45

## 2018-01-07 RX ADMIN — HYDROCORTISONE SCH: 1 CREAM TOPICAL at 10:32

## 2018-01-07 RX ADMIN — ACETAMINOPHEN PRN MG: 325 TABLET ORAL at 16:46

## 2018-01-07 RX ADMIN — GABAPENTIN SCH MG: 100 CAPSULE ORAL at 14:24

## 2018-01-07 RX ADMIN — DOXEPIN HYDROCHLORIDE SCH MG: 50 CAPSULE ORAL at 21:17

## 2018-01-08 RX ADMIN — Medication SCH TAB: at 10:22

## 2018-01-08 RX ADMIN — HYDROXYZINE PAMOATE PRN MG: 50 CAPSULE ORAL at 21:51

## 2018-01-08 RX ADMIN — HYDROXYZINE PAMOATE PRN MG: 50 CAPSULE ORAL at 15:33

## 2018-01-08 RX ADMIN — GABAPENTIN SCH MG: 100 CAPSULE ORAL at 06:04

## 2018-01-08 RX ADMIN — GABAPENTIN SCH MG: 100 CAPSULE ORAL at 21:51

## 2018-01-08 RX ADMIN — GABAPENTIN SCH MG: 100 CAPSULE ORAL at 13:32

## 2018-01-08 RX ADMIN — ACETAMINOPHEN PRN MG: 325 TABLET ORAL at 03:02

## 2018-01-08 RX ADMIN — HYDROXYZINE PAMOATE PRN MG: 50 CAPSULE ORAL at 10:22

## 2018-01-08 RX ADMIN — Medication SCH MG: at 21:51

## 2018-01-08 RX ADMIN — DOXEPIN HYDROCHLORIDE SCH MG: 50 CAPSULE ORAL at 21:51

## 2018-01-09 RX ADMIN — DOXEPIN HYDROCHLORIDE SCH MG: 50 CAPSULE ORAL at 21:07

## 2018-01-09 RX ADMIN — Medication SCH TAB: at 10:36

## 2018-01-09 RX ADMIN — Medication SCH MG: at 21:07

## 2018-01-09 RX ADMIN — IBUPROFEN PRN MG: 400 TABLET, FILM COATED ORAL at 17:52

## 2018-01-09 RX ADMIN — HYDROXYZINE PAMOATE PRN MG: 50 CAPSULE ORAL at 21:07

## 2018-01-09 RX ADMIN — HYDROXYZINE PAMOATE PRN MG: 50 CAPSULE ORAL at 10:22

## 2018-01-09 RX ADMIN — HYDROXYZINE PAMOATE PRN MG: 50 CAPSULE ORAL at 14:33

## 2018-01-09 RX ADMIN — ACETAMINOPHEN PRN MG: 325 TABLET ORAL at 10:22

## 2018-01-09 RX ADMIN — ACETAMINOPHEN PRN MG: 325 TABLET ORAL at 03:51

## 2018-01-09 RX ADMIN — GABAPENTIN SCH MG: 100 CAPSULE ORAL at 06:07

## 2018-01-09 RX ADMIN — GABAPENTIN SCH MG: 100 CAPSULE ORAL at 21:07

## 2018-01-09 RX ADMIN — GABAPENTIN SCH MG: 100 CAPSULE ORAL at 14:35

## 2018-01-10 RX ADMIN — DOXEPIN HYDROCHLORIDE SCH MG: 50 CAPSULE ORAL at 21:42

## 2018-01-10 RX ADMIN — ACETAMINOPHEN PRN MG: 325 TABLET ORAL at 05:08

## 2018-01-10 RX ADMIN — Medication SCH TAB: at 10:11

## 2018-01-10 RX ADMIN — HYDROXYZINE PAMOATE PRN MG: 50 CAPSULE ORAL at 14:13

## 2018-01-10 RX ADMIN — ACETAMINOPHEN PRN MG: 325 TABLET ORAL at 14:13

## 2018-01-10 RX ADMIN — HYDROXYZINE PAMOATE PRN MG: 50 CAPSULE ORAL at 21:42

## 2018-01-10 RX ADMIN — GABAPENTIN SCH MG: 100 CAPSULE ORAL at 05:09

## 2018-01-10 RX ADMIN — HYDROXYZINE PAMOATE PRN MG: 50 CAPSULE ORAL at 10:11

## 2018-01-10 RX ADMIN — GABAPENTIN SCH MG: 100 CAPSULE ORAL at 14:13

## 2018-01-10 RX ADMIN — GABAPENTIN SCH MG: 100 CAPSULE ORAL at 21:42

## 2018-01-10 RX ADMIN — Medication SCH MG: at 21:42

## 2018-01-11 RX ADMIN — DOXEPIN HYDROCHLORIDE SCH MG: 50 CAPSULE ORAL at 21:57

## 2018-01-11 RX ADMIN — ACETAMINOPHEN PRN MG: 325 TABLET ORAL at 10:17

## 2018-01-11 RX ADMIN — ACETAMINOPHEN PRN MG: 325 TABLET ORAL at 02:59

## 2018-01-11 RX ADMIN — GABAPENTIN SCH MG: 100 CAPSULE ORAL at 14:07

## 2018-01-11 RX ADMIN — GABAPENTIN SCH: 100 CAPSULE ORAL at 22:00

## 2018-01-11 RX ADMIN — HYDROXYZINE PAMOATE PRN MG: 50 CAPSULE ORAL at 07:13

## 2018-01-11 RX ADMIN — HYDROXYZINE PAMOATE PRN MG: 50 CAPSULE ORAL at 21:59

## 2018-01-11 RX ADMIN — Medication SCH TAB: at 10:17

## 2018-01-11 RX ADMIN — Medication SCH: at 22:00

## 2018-01-11 RX ADMIN — GABAPENTIN SCH MG: 100 CAPSULE ORAL at 05:56

## 2018-01-11 RX ADMIN — HYDROXYZINE PAMOATE PRN MG: 50 CAPSULE ORAL at 14:07

## 2018-01-11 RX ADMIN — HYDROXYZINE PAMOATE PRN MG: 50 CAPSULE ORAL at 02:59

## 2018-01-12 RX ADMIN — HYDROXYZINE PAMOATE PRN MG: 50 CAPSULE ORAL at 05:28

## 2018-01-12 RX ADMIN — Medication SCH MG: at 22:05

## 2018-01-12 RX ADMIN — GABAPENTIN SCH MG: 100 CAPSULE ORAL at 22:05

## 2018-01-12 RX ADMIN — Medication SCH TAB: at 10:12

## 2018-01-12 RX ADMIN — HYDROXYZINE PAMOATE PRN MG: 50 CAPSULE ORAL at 10:12

## 2018-01-12 RX ADMIN — ACETAMINOPHEN PRN MG: 325 TABLET ORAL at 05:28

## 2018-01-12 RX ADMIN — GABAPENTIN SCH MG: 100 CAPSULE ORAL at 05:29

## 2018-01-12 RX ADMIN — ACETAMINOPHEN PRN MG: 325 TABLET ORAL at 19:59

## 2018-01-12 RX ADMIN — HYDROXYZINE PAMOATE PRN MG: 50 CAPSULE ORAL at 19:59

## 2018-01-12 RX ADMIN — GABAPENTIN SCH MG: 100 CAPSULE ORAL at 14:35

## 2018-01-12 RX ADMIN — DOXEPIN HYDROCHLORIDE SCH MG: 50 CAPSULE ORAL at 22:05

## 2018-01-13 RX ADMIN — GABAPENTIN SCH MG: 100 CAPSULE ORAL at 14:14

## 2018-01-13 RX ADMIN — GABAPENTIN SCH MG: 100 CAPSULE ORAL at 06:05

## 2018-01-13 RX ADMIN — Medication SCH TAB: at 10:01

## 2018-01-13 RX ADMIN — DOXEPIN HYDROCHLORIDE SCH MG: 50 CAPSULE ORAL at 21:34

## 2018-01-13 RX ADMIN — ACETAMINOPHEN PRN MG: 325 TABLET ORAL at 10:02

## 2018-01-13 RX ADMIN — HYDROXYZINE PAMOATE PRN MG: 50 CAPSULE ORAL at 10:02

## 2018-01-13 RX ADMIN — GABAPENTIN SCH MG: 100 CAPSULE ORAL at 21:33

## 2018-01-13 RX ADMIN — Medication SCH MG: at 21:34

## 2018-01-13 RX ADMIN — HYDROXYZINE PAMOATE PRN MG: 50 CAPSULE ORAL at 14:14

## 2018-01-13 RX ADMIN — HYDROXYZINE PAMOATE PRN MG: 50 CAPSULE ORAL at 21:33

## 2018-01-14 RX ADMIN — HYDROXYZINE PAMOATE PRN MG: 50 CAPSULE ORAL at 15:34

## 2018-01-14 RX ADMIN — GABAPENTIN SCH MG: 100 CAPSULE ORAL at 21:24

## 2018-01-14 RX ADMIN — GABAPENTIN SCH MG: 100 CAPSULE ORAL at 14:50

## 2018-01-14 RX ADMIN — GABAPENTIN SCH MG: 100 CAPSULE ORAL at 06:24

## 2018-01-14 RX ADMIN — Medication SCH MG: at 21:24

## 2018-01-14 RX ADMIN — DOXEPIN HYDROCHLORIDE SCH MG: 50 CAPSULE ORAL at 21:24

## 2018-01-14 RX ADMIN — HYDROXYZINE PAMOATE PRN MG: 50 CAPSULE ORAL at 21:24

## 2018-01-14 RX ADMIN — Medication SCH TAB: at 09:58

## 2018-01-15 VITALS — TEMPERATURE: 98.2 F | HEART RATE: 77 BPM | SYSTOLIC BLOOD PRESSURE: 120 MMHG | DIASTOLIC BLOOD PRESSURE: 73 MMHG

## 2018-01-15 RX ADMIN — Medication SCH TAB: at 09:24

## 2018-01-15 RX ADMIN — HYDROXYZINE PAMOATE PRN MG: 50 CAPSULE ORAL at 11:30

## 2018-01-15 RX ADMIN — GABAPENTIN SCH MG: 100 CAPSULE ORAL at 06:40

## 2018-01-15 NOTE — PN
Psychiatric Progress Note


Vital Signs: 


 Vital Signs











 Period  Temp  Pulse  Resp  BP Sys/Cornejo  Pulse Ox


 


 Last 24 Hr  98.2 F  77  18-18  120/73  











Date of Session: 01/15/18


Chief Complaint:: Discharge Note


HPI: Patient addressing Opoid Dependence comorbid with ADHD, MDD, Substance-

induced Anxiety Disorder and Substance-induced Sleep Disorder


ROS: Fracture left tibia


Current Medications: 


Active Medications











Generic Name Dose Route Start Last Admin





  Trade Name Freq  PRN Reason Stop Dose Admin


 


Acetaminophen  650 mg  01/02/18 23:31  01/13/18 10:02





  Tylenol -  PO   650 mg





  Q4H PRN   Administration





  PAIN   


 


Al Hydroxide/Mg Hydroxide  30 ml  01/02/18 23:31  





  Mylanta Oral Suspension -  PO   





  Q6H PRN   





  DYSPEPSIA   


 


Doxepin HCl  50 mg  01/05/18 22:00  01/14/18 21:24





  Sinequan -  PO   50 mg





  HS [facility]   Administration


 


Eucalyptus/Menthol/Phenol/Sorbitol  1 each  01/02/18 23:31  





  Cepastat Lozenge -  MM   





  Q4H PRN   





  SORE THROAT   


 


Gabapentin  100 mg  01/03/18 06:00  01/15/18 06:40





  Neurontin -  PO   100 mg





  TID [facility]   Administration


 


Guaifenesin  10 ml  01/02/18 23:31  





  Robitussin Dm -  PO   





  Q6H PRN   





  COUGH   


 


Hydroxyzine Pamoate  50 mg  01/03/18 11:16  01/15/18 11:30





  Vistaril -  PO   50 mg





  Q4H PRN   Administration





  ANXIETY   


 


Ibuprofen  400 mg  01/02/18 23:31  01/09/18 17:52





  Motrin -  PO   400 mg





  Q6H PRN   Administration





  SEVERE PAIN   


 


Loperamide HCl  4 mg  01/02/18 23:31  





  Imodium -  PO   





  Q6H PRN   





  DIARRHEA   


 


Magnesium Citrate  300 ml  01/02/18 23:31  





  Citroma -  PO   





  Q48H PRN   





  CONSTIPATION   


 


Magnesium Hydroxide  30 ml  01/02/18 23:31  





  Milk Of Magnesia -  PO   





  DAILY PRN   





  CONSTIPATION   


 


Nicotine Polacrilex  2 mg  01/04/18 12:26  





  Nicorette Gum -  [facility]   





  Q2H PRN   





  NICOTINE REPLACEMENT RX   


 


Prenatal Multivit/Folic Acid/Iron  1 tab  01/03/18 10:00  01/15/18 09:24





  Prenatal Vitamins (Sjr) -  PO   1 tab





  DAILY [facility]   Administration


 


Pseudoephedrine/Triprolidine  1 combo  01/02/18 23:31  





  Actifed -  PO   





  TID PRN   





  NASAL CONGESTION   


 


Thiamine HCl  100 mg  01/03/18 22:00  01/14/18 21:24





  Vitamin B1 -  PO   100 mg





  HS [facility]   Administration











Current Side Effect: No


Lab tests ordered: Yes


Lab tests reviewed: Yes


Provider note:: Patient completed this program today. He has met his treatment 

gaols and will continue to address his issues in outpatient treatment at [facility]. He verbalized understanding of the negative consequences of 

his addiction and from his participation in this program, he has learned the 

importance using the tools acquired to chinyere mirza. He responde well to 

Doxepin 50 mg po HS. Script for 30 days supply of medication is electronically 

transmitted to [facility]. He is stable for discharge today


Total face to face time:: 35





Mental Status Exam





- Mental Status Exam


Alert and Oriented to: Time, Place, Person


Cognitive Function: Fair


Patient Appearance: Well Groomed


Mood: Hopeful, Euthymic


Affect: Appropriate


Patient Behavior: Cooperative


Speech Pattern: Clear


Voice Loudness: Normal


Thought Process: Intact, Goal Oriented


Thought Disorder: Not Present


Hallucinations: Denies


Suicidal Ideation: Denies


Homicidal Ideation: Denies


Insight/Judgement: Fair


Sleep: Fair


Appetite: Good


Muscle strength/Tone: Normal


Gait/Station: Normal





Psychiatric Treatment Plan





- Problem List


(1) Opioid dependence


Current Visit: Yes   





(2) ADHD (attention deficit hyperactivity disorder)


Current Visit: Yes   





(3) MDD (major depressive disorder)


Current Visit: Yes   





(4) Substance-induced anxiety disorder


Current Visit: Yes   





(5) Substance-induced sleep disorder


Current Visit: Yes   





(6) Left tibial fracture


Current Visit: Yes   


Initial treatment plan: Patient is discharged today and referred to [facility] for [facility] treatment

## 2018-02-06 ENCOUNTER — HOSPITAL ENCOUNTER (EMERGENCY)
Dept: HOSPITAL 31 - C.ER | Age: 33
LOS: 1 days | Discharge: HOME | End: 2018-02-07
Payer: COMMERCIAL

## 2018-02-06 DIAGNOSIS — F19.20: Primary | ICD-10-CM

## 2018-02-07 ENCOUNTER — HOSPITAL ENCOUNTER (INPATIENT)
Dept: HOSPITAL 31 - C.ER | Age: 33
LOS: 5 days | Discharge: NURSING, REHAB OR LONG-TERM CARE | DRG: 745 | End: 2018-02-12
Attending: PSYCHIATRY & NEUROLOGY | Admitting: PSYCHIATRY & NEUROLOGY
Payer: MEDICAID

## 2018-02-07 VITALS
TEMPERATURE: 97.8 F | HEART RATE: 81 BPM | OXYGEN SATURATION: 95 % | RESPIRATION RATE: 18 BRPM | DIASTOLIC BLOOD PRESSURE: 62 MMHG | SYSTOLIC BLOOD PRESSURE: 107 MMHG

## 2018-02-07 DIAGNOSIS — F32.9: ICD-10-CM

## 2018-02-07 DIAGNOSIS — F14.90: ICD-10-CM

## 2018-02-07 DIAGNOSIS — F11.23: Primary | ICD-10-CM

## 2018-02-07 DIAGNOSIS — F17.210: ICD-10-CM

## 2018-02-07 LAB
ALBUMIN SERPL-MCNC: 3.7 G/DL (ref 3.5–5)
ALBUMIN/GLOB SERPL: 1.1 {RATIO} (ref 1–2.1)
ALT SERPL-CCNC: 22 U/L (ref 21–72)
AST SERPL-CCNC: 26 U/L (ref 17–59)
BACTERIA #/AREA URNS HPF: (no result) /[HPF]
BASOPHILS # BLD AUTO: 0.1 K/UL (ref 0–0.2)
BASOPHILS NFR BLD: 0.6 % (ref 0–2)
BILIRUB UR-MCNC: NEGATIVE MG/DL
BILIRUB UR-MCNC: NEGATIVE MG/DL
BUN SERPL-MCNC: 12 MG/DL (ref 9–20)
CALCIUM SERPL-MCNC: 8.6 MG/DL (ref 8.6–10.4)
EOSINOPHIL # BLD AUTO: 0.2 K/UL (ref 0–0.7)
EOSINOPHIL NFR BLD: 1.5 % (ref 0–4)
ERYTHROCYTE [DISTWIDTH] IN BLOOD BY AUTOMATED COUNT: 13.6 % (ref 11.5–14.5)
GFR NON-AFRICAN AMERICAN: > 60
GLUCOSE UR STRIP-MCNC: NORMAL MG/DL
GLUCOSE UR STRIP-MCNC: NORMAL MG/DL
HGB BLD-MCNC: 13 G/DL (ref 12–18)
LEUKOCYTE ESTERASE UR-ACNC: (no result) LEU/UL
LEUKOCYTE ESTERASE UR-ACNC: (no result) LEU/UL
LYMPHOCYTES # BLD AUTO: 1.7 K/UL (ref 1–4.3)
LYMPHOCYTES NFR BLD AUTO: 14 % (ref 20–40)
MCH RBC QN AUTO: 30.4 PG (ref 27–31)
MCHC RBC AUTO-ENTMCNC: 34.1 G/DL (ref 33–37)
MCV RBC AUTO: 89.2 FL (ref 80–94)
MONOCYTES # BLD: 1.7 K/UL (ref 0–0.8)
MONOCYTES NFR BLD: 13.6 % (ref 0–10)
NEUTROPHILS # BLD: 8.8 K/UL (ref 1.8–7)
NEUTROPHILS NFR BLD AUTO: 70.3 % (ref 50–75)
NRBC BLD AUTO-RTO: 0 % (ref 0–2)
PH UR STRIP: 5 [PH] (ref 5–8)
PH UR STRIP: 5 [PH] (ref 5–8)
PLATELET # BLD: 347 K/UL (ref 130–400)
PMV BLD AUTO: 7.6 FL (ref 7.2–11.7)
PROT UR STRIP-MCNC: NEGATIVE MG/DL
PROT UR STRIP-MCNC: NEGATIVE MG/DL
RBC # BLD AUTO: 4.28 MIL/UL (ref 4.4–5.9)
RBC # UR STRIP: NEGATIVE /UL
RBC # UR STRIP: NEGATIVE /UL
SP GR UR STRIP: 1.03 (ref 1–1.03)
SP GR UR STRIP: 1.03 (ref 1–1.03)
URINE NITRATE: NEGATIVE
URINE NITRATE: NEGATIVE
UROBILINOGEN UR-MCNC: 2 MG/DL (ref 0.2–1)
UROBILINOGEN UR-MCNC: 2 MG/DL (ref 0.2–1)
WBC # BLD AUTO: 12.5 K/UL (ref 4.8–10.8)

## 2018-02-07 PROCEDURE — HZ46ZZZ GROUP COUNSELING FOR SUBSTANCE ABUSE TREATMENT, PSYCHOEDUCATION: ICD-10-PCS | Performed by: PSYCHIATRY & NEUROLOGY

## 2018-02-07 PROCEDURE — HZ59ZZZ INDIVIDUAL PSYCHOTHERAPY FOR SUBSTANCE ABUSE TREATMENT, SUPPORTIVE: ICD-10-PCS | Performed by: PSYCHIATRY & NEUROLOGY

## 2018-02-07 PROCEDURE — HZ2ZZZZ DETOXIFICATION SERVICES FOR SUBSTANCE ABUSE TREATMENT: ICD-10-PCS | Performed by: PSYCHIATRY & NEUROLOGY

## 2018-02-11 VITALS — RESPIRATION RATE: 18 BRPM

## 2018-02-12 VITALS — HEART RATE: 68 BPM | TEMPERATURE: 98.1 F

## 2018-02-12 VITALS — OXYGEN SATURATION: 98 % | SYSTOLIC BLOOD PRESSURE: 124 MMHG | DIASTOLIC BLOOD PRESSURE: 75 MMHG

## 2018-05-19 ENCOUNTER — HOSPITAL ENCOUNTER (EMERGENCY)
Dept: HOSPITAL 31 - C.ER | Age: 33
Discharge: HOME | End: 2018-05-19
Payer: MEDICAID

## 2018-05-19 VITALS
DIASTOLIC BLOOD PRESSURE: 74 MMHG | SYSTOLIC BLOOD PRESSURE: 117 MMHG | TEMPERATURE: 98.3 F | OXYGEN SATURATION: 96 % | HEART RATE: 88 BPM | RESPIRATION RATE: 18 BRPM

## 2018-05-19 DIAGNOSIS — F19.10: Primary | ICD-10-CM

## 2018-10-06 ENCOUNTER — HOSPITAL ENCOUNTER (EMERGENCY)
Dept: HOSPITAL 31 - C.ER | Age: 33
Discharge: HOME | End: 2018-10-06
Payer: MEDICAID

## 2018-10-06 VITALS
OXYGEN SATURATION: 99 % | TEMPERATURE: 98.2 F | HEART RATE: 74 BPM | DIASTOLIC BLOOD PRESSURE: 71 MMHG | SYSTOLIC BLOOD PRESSURE: 124 MMHG

## 2018-10-06 VITALS — RESPIRATION RATE: 18 BRPM

## 2018-10-06 DIAGNOSIS — S20.212A: Primary | ICD-10-CM
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
## 2018-10-06 DIAGNOSIS — S20.312A: ICD-10-CM

## 2018-10-16 ENCOUNTER — HOSPITAL ENCOUNTER (EMERGENCY)
Dept: HOSPITAL 31 - C.ER | Age: 33
Discharge: HOME | End: 2018-10-16
Payer: MEDICAID

## 2018-10-16 VITALS
OXYGEN SATURATION: 96 % | HEART RATE: 74 BPM | DIASTOLIC BLOOD PRESSURE: 79 MMHG | RESPIRATION RATE: 19 BRPM | SYSTOLIC BLOOD PRESSURE: 136 MMHG

## 2018-10-16 VITALS — TEMPERATURE: 97.9 F

## 2018-10-16 DIAGNOSIS — F11.10: Primary | ICD-10-CM

## 2018-11-17 ENCOUNTER — HOSPITAL ENCOUNTER (EMERGENCY)
Dept: HOSPITAL 31 - C.ER | Age: 33
LOS: 1 days | Discharge: HOME | End: 2018-11-18
Payer: MEDICAID

## 2018-11-17 VITALS — OXYGEN SATURATION: 98 %

## 2018-11-17 DIAGNOSIS — F19.10: Primary | ICD-10-CM

## 2018-11-18 VITALS
DIASTOLIC BLOOD PRESSURE: 80 MMHG | HEART RATE: 94 BPM | TEMPERATURE: 98.6 F | RESPIRATION RATE: 19 BRPM | SYSTOLIC BLOOD PRESSURE: 126 MMHG

## 2018-11-28 ENCOUNTER — HOSPITAL ENCOUNTER (INPATIENT)
Dept: HOSPITAL 31 - C.ER | Age: 33
LOS: 10 days | Discharge: HOME | DRG: 430 | End: 2018-12-08
Attending: PSYCHIATRY & NEUROLOGY | Admitting: PSYCHIATRY & NEUROLOGY
Payer: MEDICAID

## 2018-11-28 DIAGNOSIS — F14.20: ICD-10-CM

## 2018-11-28 DIAGNOSIS — F11.20: ICD-10-CM

## 2018-11-28 DIAGNOSIS — F33.3: Primary | ICD-10-CM

## 2018-11-28 DIAGNOSIS — F17.210: ICD-10-CM

## 2018-11-28 LAB
ALBUMIN SERPL-MCNC: 4 G/DL (ref 3.5–5)
ALBUMIN/GLOB SERPL: 1.3 {RATIO} (ref 1–2.1)
ALT SERPL-CCNC: 23 U/L (ref 21–72)
AST SERPL-CCNC: 29 U/L (ref 17–59)
BACTERIA #/AREA URNS HPF: (no result) /[HPF]
BASOPHILS # BLD AUTO: 0.1 K/UL (ref 0–0.2)
BASOPHILS NFR BLD: 0.5 % (ref 0–2)
BILIRUB UR-MCNC: NEGATIVE MG/DL
BUN SERPL-MCNC: 10 MG/DL (ref 9–20)
CALCIUM SERPL-MCNC: 8.7 MG/DL (ref 8.6–10.4)
EOSINOPHIL # BLD AUTO: 0.1 K/UL (ref 0–0.7)
EOSINOPHIL NFR BLD: 0.4 % (ref 0–4)
ERYTHROCYTE [DISTWIDTH] IN BLOOD BY AUTOMATED COUNT: 13.6 % (ref 11.5–14.5)
GFR NON-AFRICAN AMERICAN: > 60
GLUCOSE UR STRIP-MCNC: NORMAL MG/DL
HGB BLD-MCNC: 12.7 G/DL (ref 12–18)
LEUKOCYTE ESTERASE UR-ACNC: (no result) LEU/UL
LYMPHOCYTES # BLD AUTO: 2.6 K/UL (ref 1–4.3)
LYMPHOCYTES NFR BLD AUTO: 18.2 % (ref 20–40)
MCH RBC QN AUTO: 29.8 PG (ref 27–31)
MCHC RBC AUTO-ENTMCNC: 33.7 G/DL (ref 33–37)
MCV RBC AUTO: 88.7 FL (ref 80–94)
MONOCYTES # BLD: 1.4 K/UL (ref 0–0.8)
MONOCYTES NFR BLD: 9.7 % (ref 0–10)
NEUTROPHILS # BLD: 10 K/UL (ref 1.8–7)
NEUTROPHILS NFR BLD AUTO: 71.2 % (ref 50–75)
NRBC BLD AUTO-RTO: 0 % (ref 0–2)
PH UR STRIP: 5 [PH] (ref 5–8)
PLATELET # BLD: 421 K/UL (ref 130–400)
PMV BLD AUTO: 7.4 FL (ref 7.2–11.7)
PROT UR STRIP-MCNC: NEGATIVE MG/DL
RBC # BLD AUTO: 4.26 MIL/UL (ref 4.4–5.9)
RBC # UR STRIP: (no result) /UL
SP GR UR STRIP: 1.02 (ref 1–1.03)
UROBILINOGEN UR-MCNC: NORMAL MG/DL (ref 0.2–1)
WBC # BLD AUTO: 14.1 K/UL (ref 4.8–10.8)

## 2018-11-29 PROCEDURE — GZ3ZZZZ MEDICATION MANAGEMENT: ICD-10-PCS | Performed by: PSYCHIATRY & NEUROLOGY

## 2018-11-29 PROCEDURE — HZ89ZZZ MEDICATION MANAGEMENT FOR SUBSTANCE ABUSE TREATMENT, OTHER REPLACEMENT MEDICATION: ICD-10-PCS | Performed by: PSYCHIATRY & NEUROLOGY

## 2018-11-29 PROCEDURE — GZ56ZZZ INDIVIDUAL PSYCHOTHERAPY, SUPPORTIVE: ICD-10-PCS | Performed by: PSYCHIATRY & NEUROLOGY

## 2018-11-29 PROCEDURE — GZHZZZZ GROUP PSYCHOTHERAPY: ICD-10-PCS | Performed by: PSYCHIATRY & NEUROLOGY

## 2018-12-01 RX ADMIN — PURIFIED WATER PRN LOZ: 99.05 LIQUID OPHTHALMIC at 15:36

## 2018-12-01 RX ADMIN — PURIFIED WATER PRN LOZ: 99.05 LIQUID OPHTHALMIC at 09:32

## 2018-12-02 VITALS — OXYGEN SATURATION: 93 %

## 2018-12-02 LAB
HEPATITIS A IGM: NEGATIVE
HEPATITIS B CORE AB: NEGATIVE
HEPATITIS C ANTIBODY: NEGATIVE

## 2018-12-02 RX ADMIN — PURIFIED WATER PRN LOZ: 99.05 LIQUID OPHTHALMIC at 11:16

## 2018-12-03 RX ADMIN — PURIFIED WATER PRN LOZ: 99.05 LIQUID OPHTHALMIC at 14:40

## 2018-12-03 RX ADMIN — PURIFIED WATER PRN LOZ: 99.05 LIQUID OPHTHALMIC at 08:52

## 2018-12-04 RX ADMIN — PURIFIED WATER PRN LOZ: 99.05 LIQUID OPHTHALMIC at 12:34

## 2018-12-05 RX ADMIN — PURIFIED WATER PRN LOZ: 99.05 LIQUID OPHTHALMIC at 17:45

## 2018-12-06 RX ADMIN — PURIFIED WATER PRN LOZ: 99.05 LIQUID OPHTHALMIC at 14:32

## 2018-12-07 RX ADMIN — PURIFIED WATER PRN LOZ: 99.05 LIQUID OPHTHALMIC at 11:59

## 2018-12-08 RX ADMIN — PURIFIED WATER PRN LOZ: 99.05 LIQUID OPHTHALMIC at 21:25

## 2018-12-09 VITALS
HEART RATE: 82 BPM | RESPIRATION RATE: 20 BRPM | TEMPERATURE: 98.1 F | DIASTOLIC BLOOD PRESSURE: 72 MMHG | SYSTOLIC BLOOD PRESSURE: 115 MMHG

## 2018-12-25 ENCOUNTER — HOSPITAL ENCOUNTER (EMERGENCY)
Dept: HOSPITAL 31 - C.ER | Age: 33
Discharge: HOME | End: 2018-12-25
Payer: MEDICAID

## 2018-12-25 VITALS
SYSTOLIC BLOOD PRESSURE: 110 MMHG | RESPIRATION RATE: 20 BRPM | OXYGEN SATURATION: 98 % | HEART RATE: 89 BPM | DIASTOLIC BLOOD PRESSURE: 62 MMHG | TEMPERATURE: 98.5 F

## 2018-12-25 DIAGNOSIS — J06.9: Primary | ICD-10-CM

## 2018-12-25 DIAGNOSIS — Z59.0: ICD-10-CM

## 2018-12-25 SDOH — ECONOMIC STABILITY - HOUSING INSECURITY: HOMELESSNESS: Z59.0

## 2019-03-10 ENCOUNTER — HOSPITAL ENCOUNTER (EMERGENCY)
Dept: HOSPITAL 31 - C.ER | Age: 34
Discharge: HOME | End: 2019-03-10
Payer: MEDICAID

## 2019-03-10 VITALS
SYSTOLIC BLOOD PRESSURE: 117 MMHG | DIASTOLIC BLOOD PRESSURE: 69 MMHG | TEMPERATURE: 98.4 F | HEART RATE: 98 BPM | OXYGEN SATURATION: 95 %

## 2019-03-10 VITALS — RESPIRATION RATE: 18 BRPM

## 2019-03-10 DIAGNOSIS — L03.012: Primary | ICD-10-CM

## 2019-03-11 ENCOUNTER — HOSPITAL ENCOUNTER (EMERGENCY)
Dept: HOSPITAL 31 - C.ER | Age: 34
Discharge: HOME | End: 2019-03-11
Payer: MEDICAID

## 2019-03-11 VITALS
OXYGEN SATURATION: 99 % | RESPIRATION RATE: 18 BRPM | SYSTOLIC BLOOD PRESSURE: 128 MMHG | TEMPERATURE: 97.6 F | HEART RATE: 92 BPM | DIASTOLIC BLOOD PRESSURE: 76 MMHG

## 2019-03-11 VITALS — BODY MASS INDEX: 23.7 KG/M2

## 2019-03-11 DIAGNOSIS — F11.20: Primary | ICD-10-CM

## 2019-03-14 ENCOUNTER — HOSPITAL ENCOUNTER (EMERGENCY)
Dept: HOSPITAL 31 - C.ER | Age: 34
End: 2019-03-14
Payer: MEDICAID

## 2019-03-26 ENCOUNTER — HOSPITAL ENCOUNTER (EMERGENCY)
Dept: HOSPITAL 14 - H.ER | Age: 34
Discharge: HOME | End: 2019-03-26
Payer: MEDICAID

## 2019-03-26 ENCOUNTER — HOSPITAL ENCOUNTER (INPATIENT)
Dept: HOSPITAL 31 - C.ER | Age: 34
LOS: 3 days | Discharge: HOME | DRG: 744 | End: 2019-03-29
Attending: PSYCHIATRY & NEUROLOGY | Admitting: PSYCHIATRY & NEUROLOGY
Payer: MEDICAID

## 2019-03-26 VITALS
RESPIRATION RATE: 16 BRPM | DIASTOLIC BLOOD PRESSURE: 86 MMHG | TEMPERATURE: 98.3 F | OXYGEN SATURATION: 99 % | HEART RATE: 79 BPM | SYSTOLIC BLOOD PRESSURE: 128 MMHG

## 2019-03-26 VITALS — BODY MASS INDEX: 23.7 KG/M2

## 2019-03-26 DIAGNOSIS — F41.9: ICD-10-CM

## 2019-03-26 DIAGNOSIS — F11.23: Primary | ICD-10-CM

## 2019-03-26 DIAGNOSIS — F11.10: Primary | ICD-10-CM

## 2019-03-26 DIAGNOSIS — F14.20: ICD-10-CM

## 2019-03-26 DIAGNOSIS — F17.210: ICD-10-CM

## 2019-03-26 DIAGNOSIS — R45.851: ICD-10-CM

## 2019-03-26 DIAGNOSIS — F33.2: ICD-10-CM

## 2019-03-26 LAB
ALBUMIN SERPL-MCNC: 3.7 G/DL (ref 3.5–5)
ALBUMIN/GLOB SERPL: 1.3 {RATIO} (ref 1–2.1)
ALT SERPL-CCNC: 25 U/L (ref 21–72)
AST SERPL-CCNC: 30 U/L (ref 17–59)
BASOPHILS # BLD AUTO: 0 K/UL (ref 0–0.2)
BASOPHILS NFR BLD: 0.6 % (ref 0–2)
BILIRUB UR-MCNC: NEGATIVE MG/DL
BUN SERPL-MCNC: 12 MG/DL (ref 9–20)
CALCIUM SERPL-MCNC: 8.7 MG/DL (ref 8.6–10.4)
EOSINOPHIL # BLD AUTO: 0.1 K/UL (ref 0–0.7)
EOSINOPHIL NFR BLD: 1.7 % (ref 0–4)
ERYTHROCYTE [DISTWIDTH] IN BLOOD BY AUTOMATED COUNT: 12.7 % (ref 11.5–14.5)
GFR NON-AFRICAN AMERICAN: > 60
GLUCOSE UR STRIP-MCNC: NORMAL MG/DL
HGB BLD-MCNC: 12.4 G/DL (ref 12–18)
LEUKOCYTE ESTERASE UR-ACNC: (no result) LEU/UL
LYMPHOCYTES # BLD AUTO: 1.6 K/UL (ref 1–4.3)
LYMPHOCYTES NFR BLD AUTO: 19.8 % (ref 20–40)
MCH RBC QN AUTO: 30.2 PG (ref 27–31)
MCHC RBC AUTO-ENTMCNC: 33.9 G/DL (ref 33–37)
MCV RBC AUTO: 89.3 FL (ref 80–94)
MONOCYTES # BLD: 0.9 K/UL (ref 0–0.8)
MONOCYTES NFR BLD: 11.5 % (ref 0–10)
NEUTROPHILS # BLD: 5.2 K/UL (ref 1.8–7)
NEUTROPHILS NFR BLD AUTO: 66.4 % (ref 50–75)
NRBC BLD AUTO-RTO: 0 % (ref 0–2)
PH UR STRIP: 5 [PH] (ref 5–8)
PLATELET # BLD: 298 K/UL (ref 130–400)
PMV BLD AUTO: 7.8 FL (ref 7.2–11.7)
PROT UR STRIP-MCNC: NEGATIVE MG/DL
RBC # BLD AUTO: 4.11 MIL/UL (ref 4.4–5.9)
RBC # UR STRIP: (no result) /UL
SP GR UR STRIP: 1.03 (ref 1–1.03)
UROBILINOGEN UR-MCNC: NORMAL MG/DL (ref 0.2–1)
WBC # BLD AUTO: 7.9 K/UL (ref 4.8–10.8)

## 2019-03-26 PROCEDURE — HZ2ZZZZ DETOXIFICATION SERVICES FOR SUBSTANCE ABUSE TREATMENT: ICD-10-PCS | Performed by: PSYCHIATRY & NEUROLOGY

## 2019-03-26 PROCEDURE — GZ56ZZZ INDIVIDUAL PSYCHOTHERAPY, SUPPORTIVE: ICD-10-PCS | Performed by: PSYCHIATRY & NEUROLOGY

## 2019-03-26 PROCEDURE — GZ58ZZZ INDIVIDUAL PSYCHOTHERAPY, COGNITIVE-BEHAVIORAL: ICD-10-PCS | Performed by: PSYCHIATRY & NEUROLOGY

## 2019-03-26 PROCEDURE — HZ59ZZZ INDIVIDUAL PSYCHOTHERAPY FOR SUBSTANCE ABUSE TREATMENT, SUPPORTIVE: ICD-10-PCS | Performed by: PSYCHIATRY & NEUROLOGY

## 2019-03-26 PROCEDURE — HZ42ZZZ GROUP COUNSELING FOR SUBSTANCE ABUSE TREATMENT, COGNITIVE-BEHAVIORAL: ICD-10-PCS | Performed by: PSYCHIATRY & NEUROLOGY

## 2019-03-26 PROCEDURE — HZ46ZZZ GROUP COUNSELING FOR SUBSTANCE ABUSE TREATMENT, PSYCHOEDUCATION: ICD-10-PCS | Performed by: PSYCHIATRY & NEUROLOGY

## 2019-03-26 PROCEDURE — HZ56ZZZ INDIVIDUAL PSYCHOTHERAPY FOR SUBSTANCE ABUSE TREATMENT, PSYCHOEDUCATION: ICD-10-PCS | Performed by: PSYCHIATRY & NEUROLOGY

## 2019-03-26 PROCEDURE — GZHZZZZ GROUP PSYCHOTHERAPY: ICD-10-PCS | Performed by: PSYCHIATRY & NEUROLOGY

## 2019-03-26 PROCEDURE — HZ52ZZZ INDIVIDUAL PSYCHOTHERAPY FOR SUBSTANCE ABUSE TREATMENT, COGNITIVE-BEHAVIORAL: ICD-10-PCS | Performed by: PSYCHIATRY & NEUROLOGY

## 2019-03-26 NOTE — ED PDOC
HPI: Psych/Substance Abuse


Time Seen by Provider: 03/26/19 02:01


Chief Complaint (Nursing): Psychiatric Evaluation


Chief Complaint (Provider): Psychiatric Evaluation


History Per: Patient


History/Exam Limitations: no limitations


Associated Symptoms: Depression


Additional Complaint(s): 





34 y/o  male with history of depression presents to the ED with 

depression. Patient reports he has been feeling depressed and wants evaluation. 

Denies any homicidal or suicidal ideation. 





Past Medical History


Reviewed: Historical Data, Nursing Documentation, Vital Signs


Vital Signs: 





                                Last Vital Signs











Temp  98.9 F   03/26/19 01:41


 


Pulse  96 H  03/26/19 01:41


 


Resp  17   03/26/19 01:41


 


BP  128/82   03/26/19 01:41


 


Pulse Ox  100   03/26/19 01:41














- Medical History


PMH: Anxiety, Back Problems, Depression


   Denies: Diabetes, Hepatitis, HIV, HTN, Chronic Kidney Disease, Seizures, 

Sexually Transmitted Disease





- Family History


Family History: States: Unknown Family Hx





- Immunization History


Hx Tetanus Toxoid Vaccination: No


Hx Influenza Vaccination: No


Hx Pneumococcal Vaccination: No





- Home Medications


Home Medications: 


                                Ambulatory Orders











 Medication  Instructions  Recorded


 


Famotidine [Pepcid] 20 mg PO BID PRN #15 tab 03/11/19


 


Ondansetron ODT [Zofran ODT] 1 odt PO BID PRN #15 odt 03/11/19














- Allergies


Allergies/Adverse Reactions: 


                                    Allergies











Allergy/AdvReac Type Severity Reaction Status Date / Time


 


No Known Allergies Allergy   Verified 03/14/19 15:10














Review of Systems


ROS Statement: Except As Marked, All Systems Reviewed And Found Negative


Psych: Positive for: Depression





Physical Exam





- Reviewed


Nursing Documentation Reviewed: Yes


Vital Signs Reviewed: Yes





- Physical Exam


Appears: Positive for: Well, Non-toxic, No Acute Distress


Head Exam: Positive for: ATRAUMATIC, NORMAL INSPECTION, NORMOCEPHALIC


Skin: Positive for: Normal Color, Warm, DRY


Eye Exam: Positive for: EOMI, Normal appearance, PERRL


ENT: Positive for: Normal ENT Inspection


Neck: Positive for: Normal, Painless ROM


Cardiovascular/Chest: Positive for: Regular Rate, Rhythm.  Negative for: Murmur


Respiratory: Positive for: Normal Breath Sounds.  Negative for: Respiratory 

Distress


Gastrointestinal/Abdominal: Positive for: Normal Exam, Soft.  Negative for: Tend

erness


Back: Positive for: Normal Inspection


Extremity: Positive for: Normal ROM.  Negative for: Pedal Edema, Deformity


Neurological/Psych: Positive for: Awake, Alert, Normal Tone, Oriented (x3).  

Negative for: Motor/Sensory Deficits





- ECG


O2 Sat by Pulse Oximetry: 100 (RA)


Pulse Ox Interpretation: Normal





Medical Decision Making


Medical Decision Making: 





Time: 02:02


Impression: 34 y/o with depression


Initial Plan:


* Crisis evaluation 


* Urine drug screen





03:12


Patient cleared by crisis and stable for discharge home. Diagnosis is opiate use

disorder by Dr. Valentin.


-------------------------------------------

------------------------------------------------------


Scribe Attestation:


Documented by Wayne Bender, acting as a scribe Marina Falcon MD 





Provider Scribe Attestation:


All medical record entries made by the Scribe were at my direction and 

personally dictated by me. I have reviewed the chart and agree that the record 

accurately reflects my personal performance of the history, physical exam, 

medical decision making, and the department course for this patient. I have also

personally directed, reviewed, and agree with the discharge instructions and 

disposition





Disposition





- Clinical Impression


Clinical Impression: 


 Opioid use disorder








- Patient ED Disposition


Is Patient to be Admitted: No





- Disposition


Disposition: Routine/Home


Disposition Time: 03:12


Condition: STABLE


Additional Instructions: 





KYLEIGH RODRIGUEZ, thank you for letting us take care of you today. Your 

provider was Sukhwinder Falcon MD and you were treated for PSYCH EVAL. The 

emergency medical care you received today was directed at your acute symptoms. 

If you were prescribed any medication, please fill it and take as directed. It 

may take several days for your symptoms to resolve. Return to the Emergency 

Department if your symptoms worsen, do not improve, or if you have any other 

problems.





Please contact your doctor or call one of the physicians/clinics you have been 

referred to that are listed on the Patient Visit Information form that is 

included in your discharge packet. Bring any paperwork you were given at 

discharge with you along with any medications you are taking to your follow up 

visit. Our treatment cannot replace ongoing medical care by a primary care 

provider outside of the emergency department.





Thank you for allowing the [facility] team to be part of your care today.








If you had an X-Ray or CT scan: A Radiologist will review the ED reading if any 

change in treatment is needed we will contact you.***





If you had a blood, urine, or wound culture: It will take several days for the 

results, if any change in treatment is needed we will contact you.***





If you had an STI test: It will take 48 hours for the results. Please call after

1 week if you have not heard back.***


Instructions:  Drug Abuse Treatment


Forms:  [facility] (English)

## 2019-03-28 VITALS — RESPIRATION RATE: 18 BRPM

## 2019-03-29 VITALS — HEART RATE: 76 BPM

## 2019-03-29 VITALS — TEMPERATURE: 97.6 F | SYSTOLIC BLOOD PRESSURE: 130 MMHG | OXYGEN SATURATION: 98 % | DIASTOLIC BLOOD PRESSURE: 86 MMHG

## 2019-03-30 ENCOUNTER — HOSPITAL ENCOUNTER (EMERGENCY)
Dept: HOSPITAL 31 - C.ER | Age: 34
Discharge: HOME | End: 2019-03-30
Payer: MEDICAID

## 2019-03-30 VITALS — SYSTOLIC BLOOD PRESSURE: 110 MMHG | HEART RATE: 90 BPM | DIASTOLIC BLOOD PRESSURE: 80 MMHG | TEMPERATURE: 98 F

## 2019-03-30 VITALS — BODY MASS INDEX: 23.7 KG/M2

## 2019-03-30 VITALS — RESPIRATION RATE: 20 BRPM

## 2019-03-30 DIAGNOSIS — M79.645: Primary | ICD-10-CM

## 2019-03-31 VITALS — OXYGEN SATURATION: 95 %

## 2019-05-25 ENCOUNTER — HOSPITAL ENCOUNTER (EMERGENCY)
Dept: HOSPITAL 31 - C.ER | Age: 34
Discharge: HOME | End: 2019-05-25
Payer: MEDICAID

## 2019-05-25 VITALS — BODY MASS INDEX: 23.7 KG/M2

## 2019-05-25 VITALS — SYSTOLIC BLOOD PRESSURE: 112 MMHG | HEART RATE: 82 BPM | DIASTOLIC BLOOD PRESSURE: 68 MMHG | RESPIRATION RATE: 98 BRPM

## 2019-05-25 VITALS — OXYGEN SATURATION: 98 % | TEMPERATURE: 98.4 F

## 2019-05-25 DIAGNOSIS — R59.1: Primary | ICD-10-CM

## 2019-05-25 DIAGNOSIS — R31.9: ICD-10-CM

## 2019-05-25 DIAGNOSIS — R10.31: ICD-10-CM

## 2019-05-25 LAB
ALBUMIN SERPL-MCNC: 4.1 G/DL (ref 3.5–5)
ALBUMIN/GLOB SERPL: 1.2 {RATIO} (ref 1–2.1)
ALT SERPL-CCNC: 16 U/L (ref 21–72)
AST SERPL-CCNC: 25 U/L (ref 17–59)
BASOPHILS # BLD AUTO: 0.1 K/UL (ref 0–0.2)
BASOPHILS NFR BLD: 0.7 % (ref 0–2)
BILIRUB UR-MCNC: NEGATIVE MG/DL
BUN SERPL-MCNC: 10 MG/DL (ref 9–20)
CALCIUM SERPL-MCNC: 8.5 MG/DL (ref 8.6–10.4)
EOSINOPHIL # BLD AUTO: 0.1 K/UL (ref 0–0.7)
EOSINOPHIL NFR BLD: 1.3 % (ref 0–4)
ERYTHROCYTE [DISTWIDTH] IN BLOOD BY AUTOMATED COUNT: 13.2 % (ref 11.5–14.5)
GFR NON-AFRICAN AMERICAN: > 60
GLUCOSE UR STRIP-MCNC: NORMAL MG/DL
HGB BLD-MCNC: 13.5 G/DL (ref 12–18)
LEUKOCYTE ESTERASE UR-ACNC: (no result) LEU/UL
LYMPHOCYTES # BLD AUTO: 1.3 K/UL (ref 1–4.3)
LYMPHOCYTES NFR BLD AUTO: 13.2 % (ref 20–40)
MCH RBC QN AUTO: 29.8 PG (ref 27–31)
MCHC RBC AUTO-ENTMCNC: 34 G/DL (ref 33–37)
MCV RBC AUTO: 87.7 FL (ref 80–94)
MONOCYTES # BLD: 1 K/UL (ref 0–0.8)
MONOCYTES NFR BLD: 10.3 % (ref 0–10)
NEUTROPHILS # BLD: 7.3 K/UL (ref 1.8–7)
NEUTROPHILS NFR BLD AUTO: 74.5 % (ref 50–75)
NRBC BLD AUTO-RTO: 0 % (ref 0–2)
PH UR STRIP: 6 [PH] (ref 5–8)
PLATELET # BLD: 372 K/UL (ref 130–400)
PMV BLD AUTO: 8.1 FL (ref 7.2–11.7)
PROT UR STRIP-MCNC: NEGATIVE MG/DL
RBC # BLD AUTO: 4.54 MIL/UL (ref 4.4–5.9)
RBC # UR STRIP: (no result) /UL
SP GR UR STRIP: 1.02 (ref 1–1.03)
SQUAMOUS EPITHIAL: < 1 /HPF (ref 0–5)
UROBILINOGEN UR-MCNC: NORMAL MG/DL (ref 0.2–1)
WBC # BLD AUTO: 9.8 K/UL (ref 4.8–10.8)

## 2019-05-25 PROCEDURE — 81001 URINALYSIS AUTO W/SCOPE: CPT

## 2019-05-25 PROCEDURE — 80053 COMPREHEN METABOLIC PANEL: CPT

## 2019-05-25 PROCEDURE — 85025 COMPLETE CBC W/AUTO DIFF WBC: CPT

## 2019-05-25 PROCEDURE — 74177 CT ABD & PELVIS W/CONTRAST: CPT

## 2019-05-25 PROCEDURE — 99285 EMERGENCY DEPT VISIT HI MDM: CPT

## 2019-10-06 ENCOUNTER — EMERGENCY (EMERGENCY)
Facility: HOSPITAL | Age: 34
LOS: 0 days | Discharge: HOME | End: 2019-10-06
Attending: EMERGENCY MEDICINE | Admitting: EMERGENCY MEDICINE
Payer: SUBSIDIZED

## 2019-10-06 VITALS
SYSTOLIC BLOOD PRESSURE: 112 MMHG | HEART RATE: 82 BPM | TEMPERATURE: 97 F | DIASTOLIC BLOOD PRESSURE: 59 MMHG | RESPIRATION RATE: 18 BRPM

## 2019-10-06 VITALS
OXYGEN SATURATION: 98 % | DIASTOLIC BLOOD PRESSURE: 81 MMHG | TEMPERATURE: 97 F | RESPIRATION RATE: 16 BRPM | HEART RATE: 88 BPM | SYSTOLIC BLOOD PRESSURE: 128 MMHG

## 2019-10-06 DIAGNOSIS — Z87.891 PERSONAL HISTORY OF NICOTINE DEPENDENCE: ICD-10-CM

## 2019-10-06 DIAGNOSIS — G47.00 INSOMNIA, UNSPECIFIED: ICD-10-CM

## 2019-10-06 DIAGNOSIS — R44.0 AUDITORY HALLUCINATIONS: ICD-10-CM

## 2019-10-06 DIAGNOSIS — G47.8 OTHER SLEEP DISORDERS: ICD-10-CM

## 2019-10-06 DIAGNOSIS — F41.9 ANXIETY DISORDER, UNSPECIFIED: ICD-10-CM

## 2019-10-06 PROBLEM — G89.29 OTHER CHRONIC PAIN: Chronic | Status: ACTIVE | Noted: 2017-12-20

## 2019-10-06 LAB
ANION GAP SERPL CALC-SCNC: 15 MMOL/L — HIGH (ref 7–14)
APAP SERPL-MCNC: <5 UG/ML — LOW (ref 10–30)
APPEARANCE UR: CLEAR — SIGNIFICANT CHANGE UP
BASOPHILS # BLD AUTO: 0.06 K/UL — SIGNIFICANT CHANGE UP (ref 0–0.2)
BASOPHILS NFR BLD AUTO: 0.6 % — SIGNIFICANT CHANGE UP (ref 0–1)
BILIRUB UR-MCNC: NEGATIVE — SIGNIFICANT CHANGE UP
BUN SERPL-MCNC: 12 MG/DL — SIGNIFICANT CHANGE UP (ref 10–20)
CALCIUM SERPL-MCNC: 9.5 MG/DL — SIGNIFICANT CHANGE UP (ref 8.5–10.1)
CHLORIDE SERPL-SCNC: 100 MMOL/L — SIGNIFICANT CHANGE UP (ref 98–110)
CO2 SERPL-SCNC: 23 MMOL/L — SIGNIFICANT CHANGE UP (ref 17–32)
COLOR SPEC: COLORLESS — SIGNIFICANT CHANGE UP
CREAT SERPL-MCNC: 0.7 MG/DL — SIGNIFICANT CHANGE UP (ref 0.7–1.5)
DIFF PNL FLD: NEGATIVE — SIGNIFICANT CHANGE UP
EOSINOPHIL # BLD AUTO: 0.31 K/UL — SIGNIFICANT CHANGE UP (ref 0–0.7)
EOSINOPHIL NFR BLD AUTO: 2.8 % — SIGNIFICANT CHANGE UP (ref 0–8)
ETHANOL SERPL-MCNC: <10 MG/DL — SIGNIFICANT CHANGE UP
GLUCOSE SERPL-MCNC: 123 MG/DL — HIGH (ref 70–99)
GLUCOSE UR QL: NEGATIVE — SIGNIFICANT CHANGE UP
HCT VFR BLD CALC: 42.1 % — SIGNIFICANT CHANGE UP (ref 42–52)
HGB BLD-MCNC: 13.9 G/DL — LOW (ref 14–18)
IMM GRANULOCYTES NFR BLD AUTO: 0.3 % — SIGNIFICANT CHANGE UP (ref 0.1–0.3)
KETONES UR-MCNC: NEGATIVE — SIGNIFICANT CHANGE UP
LEUKOCYTE ESTERASE UR-ACNC: NEGATIVE — SIGNIFICANT CHANGE UP
LYMPHOCYTES # BLD AUTO: 3.57 K/UL — HIGH (ref 1.2–3.4)
LYMPHOCYTES # BLD AUTO: 32.8 % — SIGNIFICANT CHANGE UP (ref 20.5–51.1)
MCHC RBC-ENTMCNC: 29.6 PG — SIGNIFICANT CHANGE UP (ref 27–31)
MCHC RBC-ENTMCNC: 33 G/DL — SIGNIFICANT CHANGE UP (ref 32–37)
MCV RBC AUTO: 89.6 FL — SIGNIFICANT CHANGE UP (ref 80–94)
MONOCYTES # BLD AUTO: 1.39 K/UL — HIGH (ref 0.1–0.6)
MONOCYTES NFR BLD AUTO: 12.8 % — HIGH (ref 1.7–9.3)
NEUTROPHILS # BLD AUTO: 5.54 K/UL — SIGNIFICANT CHANGE UP (ref 1.4–6.5)
NEUTROPHILS NFR BLD AUTO: 50.7 % — SIGNIFICANT CHANGE UP (ref 42.2–75.2)
NITRITE UR-MCNC: NEGATIVE — SIGNIFICANT CHANGE UP
NRBC # BLD: 0 /100 WBCS — SIGNIFICANT CHANGE UP (ref 0–0)
PH UR: 6 — SIGNIFICANT CHANGE UP (ref 5–8)
PLATELET # BLD AUTO: 387 K/UL — SIGNIFICANT CHANGE UP (ref 130–400)
POTASSIUM SERPL-MCNC: 4.2 MMOL/L — SIGNIFICANT CHANGE UP (ref 3.5–5)
POTASSIUM SERPL-SCNC: 4.2 MMOL/L — SIGNIFICANT CHANGE UP (ref 3.5–5)
PROT UR-MCNC: NEGATIVE — SIGNIFICANT CHANGE UP
RBC # BLD: 4.7 M/UL — SIGNIFICANT CHANGE UP (ref 4.7–6.1)
RBC # FLD: 12.8 % — SIGNIFICANT CHANGE UP (ref 11.5–14.5)
SALICYLATES SERPL-MCNC: <0.3 MG/DL — LOW (ref 4–30)
SODIUM SERPL-SCNC: 138 MMOL/L — SIGNIFICANT CHANGE UP (ref 135–146)
SP GR SPEC: 1 — LOW (ref 1.01–1.02)
UROBILINOGEN FLD QL: SIGNIFICANT CHANGE UP
WBC # BLD: 10.9 K/UL — HIGH (ref 4.8–10.8)
WBC # FLD AUTO: 10.9 K/UL — HIGH (ref 4.8–10.8)

## 2019-10-06 PROCEDURE — 99053 MED SERV 10PM-8AM 24 HR FAC: CPT

## 2019-10-06 PROCEDURE — 93010 ELECTROCARDIOGRAM REPORT: CPT

## 2019-10-06 PROCEDURE — 99284 EMERGENCY DEPT VISIT MOD MDM: CPT

## 2019-10-06 RX ORDER — NICOTINE POLACRILEX 2 MG
1 GUM BUCCAL ONCE
Refills: 0 | Status: COMPLETED | OUTPATIENT
Start: 2019-10-06 | End: 2019-10-06

## 2019-10-06 RX ADMIN — Medication 1 MILLIGRAM(S): at 03:01

## 2019-10-06 RX ADMIN — Medication 1 PATCH: at 10:09

## 2019-10-06 NOTE — ED BEHAVIORAL HEALTH ASSESSMENT NOTE - CURRENT MEDICATION
Attempted to call [facility] ([address] [place] [address], [place] [id number])  [phone] for collateral, however, they are closed on sunday.   Pt endorses taking ambien and adderall.

## 2019-10-06 NOTE — ED BEHAVIORAL HEALTH ASSESSMENT NOTE - SUMMARY
34-year-old, white, male, undomiciled ("between places"), 11th grade education, history of incarceration, single, with no known past medical history, psychiatric history of opiate use disorder, severe, in remissions, PCP Use disorder, cannabis/LSD/psiloscibin/MDMA misuse, anxiety, attention deficit disorder (per the patient), recently discharged from unknown IPP, presents to ED for auditory hallucinations, psychiatry consulted for evaluation of psychosis.     Based on my assessment, the patients is not currently having signs of psychosis at this time. He endorses hearing voices last night but denies at this time, and endorses not being bothered by the voices. He does not have a known psychotic syndrome and has regular follow up with his outpatient psychiatrist in [place]. At this time there are no mood symptoms, suicidality, homicidality, perceptual disturbances, that would warrant IPP admission at this time.

## 2019-10-06 NOTE — ED BEHAVIORAL HEALTH ASSESSMENT NOTE - RISK ASSESSMENT
Risk Factors: History of substance use disorder, history of IPP admission, perceptual disturbances (AH) endorsed on admission, history of incarceration   Protective factors: Engaged in treatment, future oriented, no acute suicidality, no acute homicidality, compliant with medications, established outpatient follow up (with scheduled appointment in 1 week), social support from friends  Based on the above the patient is at chronically elevated risk but does not require IPP at this time. Low Acute Suicide Risk

## 2019-10-06 NOTE — ED BEHAVIORAL HEALTH ASSESSMENT NOTE - DETAILS
States he was inpatient at hospital in [place], which he doesn't know name of, discharged 4 days ago, no records in [place] denies all SI "body pains" Spoke with resident Unclear where he was

## 2019-10-06 NOTE — ED ADULT NURSE NOTE - CAS ELECT INFOMATION PROVIDED
DC instructions DC instructions/nicotine patch removed prior ro d/c pt states he wants to smoke a cigarette.

## 2019-10-06 NOTE — ED BEHAVIORAL HEALTH ASSESSMENT NOTE - DESCRIPTION (FIRST USE, LAST USE, QUANTITY, FREQUENCY, DURATION)
1/2 PPD States he smoked regularly after stopping heroin 6 months ago, last use was months ago Daily heroin use from 2016 until 2019 (6 months ago last use) IV, states he stopped with aid of detox and rehab at [facility] PCP use daily for 1 year, last used 1/2 year ago, MDMA use monthly, last used months ago, LSD use frequently 10 years ago, last used 8 years ago, extensive psilocybin use in teenage years.

## 2019-10-06 NOTE — ED ADULT NURSE NOTE - NSIMPLEMENTINTERV_GEN_ALL_ED
Implemented All Universal Safety Interventions:  [place] to call system. Call bell, personal items and telephone within reach. Instruct patient to call for assistance. Room bathroom lighting operational. Non-slip footwear when patient is off stretcher. Physically safe environment: no spills, clutter or unnecessary equipment. Stretcher in lowest position, wheels locked, appropriate side rails in place.

## 2019-10-06 NOTE — ED BEHAVIORAL HEALTH ASSESSMENT NOTE - REFERRAL / APPOINTMENT DETAILS
Patient has established outpatient services. Please also provide information for [facility] Chemical Dependency and rehab services. [address] [place] [address], [phone]. [phone].

## 2019-10-06 NOTE — ED PROVIDER NOTE - NS ED ROS FT
Constitutional: No fevers.   Eyes:  No visual changes, eye pain or discharge.  ENMT:  No hearing changes, pain, no sore throat or runny nose, no difficulty swallowing  Cardiac:  No chest pain, SOB or edema. No chest pain with exertion.  Respiratory:  No cough or respiratory distress. No hemoptysis. No history of asthma or RAD.  GI:  No nausea, vomiting, diarrhea or abdominal pain.  :  No dysuria, frequency or burning.  MS:  No myalgia, muscle weakness, joint pain or back pain.  Neuro:  No headache or weakness.  No LOC.  Skin:  No skin rash.   Endocrine: No history of thyroid disease or diabetes.  psych: +inability to sleep. +auditory hallucinations.

## 2019-10-06 NOTE — ED BEHAVIORAL HEALTH ASSESSMENT NOTE - HPI (INCLUDE ILLNESS QUALITY, SEVERITY, DURATION, TIMING, CONTEXT, MODIFYING FACTORS, ASSOCIATED SIGNS AND SYMPTOMS)
34-year-old, white, male, undomiciled ("between places"), 11th grade education, history of incarceration, single, with no known past medical history, psychiatric history of opiate use disorder, severe, in remissions, PCP Use disorder, cannabis/LSD/psiloscibin/MDMA misuse, anxiety, attention deficit disorder (per the patient), recently discharged from unknown IPP, presents to ED for auditory hallucinations, psychiatry consulted for evaluation of psychosis.     Upon approach, patient is laying in ED bed sleeping, when he wakes he is talkative, charismatic, oriented to person, place, and time. Appropriate throughout the conversation.    Patient states that he presented to the ED for "difficulty sleeping" last night and that he needs "a shot of something so I don't act all wild" (requesting Ativan injection) stating that "I'll go to the gym after this. He endorses hearing voices, coming from within his head, several last night, but states "I just laugh at them bro", stating they don't bother him. He does not appear preoccupied and has a linear thought process throughout the exam.     He denies acute mood disturbance stating "no bro, I'm good", denies suicidal or homicidal ideations, unable to provide a clear explanation as to why he presented to the ED. He denies current anxiety or other depressive symptoms. Denies sx of ora at this time. Endorses current use of cigarettes (1/2 PPD), however denies current use of opiates, cocaine, PCP, BZD, cannabis, etc. However, high clinical suspicion remains.     Unable to reach collateral:   Attempted to call cousin Thor Maravilla [phone], friend Zeny [phone], Friend puma [phone], friend Tony Jeffery [phone].

## 2019-10-06 NOTE — ED BEHAVIORAL HEALTH ASSESSMENT NOTE - OTHER PAST PSYCHIATRIC HISTORY (INCLUDE DETAILS REGARDING ONSET, COURSE OF ILLNESS, INPATIENT/OUTPATIENT TREATMENT)
2 previous IPP admissions for "acting crazy", unclear diagnosis or precipitating events, discharged 4 days ago from unknown IPP. no previous suicide attempts. 5 previous detoxes, last 6 months ago, after which rehabbed at  [facility] (unknown which hospital) after this. Currently has substance use services at [facility], also follows psychiatrist in [place], next appointment in 1-2 weeks.

## 2019-10-06 NOTE — ED PROVIDER NOTE - CLINICAL SUMMARY MEDICAL DECISION MAKING FREE TEXT BOX
Pt seen for auditory hallucinations and insomnia, had labs wnl and nonischemic EKG. Pt observed in ED and evaluated in AM by psychiatry ( as per telepsych). Pt cleared by psychiatry for discharge and advised to follow up with his therapist in [place] whom pt stated he will follow with. Also given referral to out outpt MH clinic. Pt  reported reeling well to go home. Strict return precautions advised and pt verbalized understanding.

## 2019-10-06 NOTE — ED PROVIDER NOTE - PHYSICAL EXAMINATION
CONSTITUTIONAL: Well-developed; well-nourished; in no acute distress.   SKIN: warm, dry.  HEAD: Normocephalic; atraumatic.  EYES: PERRL, EOMI, no conjunctival erythema.  ENT: No nasal discharge; airway clear.  NECK: Supple; non tender.  CARD: S1, S2 normal; no murmurs, gallops, or rubs. Regular rate and rhythm.   RESP: No wheezes, rales or rhonchi.  ABD: soft ntnd.  EXT: Normal ROM.  No clubbing, cyanosis or edema.   LYMPH: No acute cervical adenopathy.  NEURO: Alert, oriented, grossly unremarkable.  PSYCH: Cooperative, appropriate. Answers questions. Pressured speech.

## 2019-10-06 NOTE — ED PROVIDER NOTE - NSFOLLOWUPINSTRUCTIONS_ED_ALL_ED_FT
Please follow up with our [facility] in 1-2 days. [phone], [address] [place] [address]    Please also follow up with your PCP in [place] this week.       Hallucinations    WHAT YOU NEED TO KNOW:    Hallucinations are things you see, hear, feel, taste, or smell that seem real but are not. Some hallucinations are temporary. Hallucinations that continue, interfere with daily activities, or worsen may be a sign of a serious medical or mental condition that needs treatment.    DISCHARGE INSTRUCTIONS:    Call 911 if you or someone else notices any of the following:     You want to harm yourself or someone else.      You hear voices telling you to harm yourself or someone else.      You have a seizure.      You are confused, do not know where you are, or are not making sense when you speak.    Seek care immediately if:     Your hallucinations come back after treatment.      You vomit several times in a row.      Your heartbeat or breathing is faster or slower than usual.       You have trouble breathing or shortness of breath.    Contact your healthcare provider if:     You have new hallucinations.      You have questions or concerns about your condition or care.    Medicines:     Medicines may be given to stop the hallucinations, reduce anxiety, or relax your muscles.      Take your medicine as directed. Contact your healthcare provider if you think your medicine is not helping or if you have side effects. Tell him or her if you are allergic to any medicine. Keep a list of the medicines, vitamins, and herbs you take. Include the amounts, and when and why you take them. Bring the list or the pill bottles to follow-up visits. Carry your medicine list with you in case of an emergency.    Follow up with your healthcare provider as directed: Write down your questions so you remember to ask them during your visits.

## 2019-10-06 NOTE — ED PROVIDER NOTE - ATTENDING CONTRIBUTION TO CARE
34 M to ED with acute psychosis  aud self deprecation hallucinations and bizzare tangential speech  no fevers, no sick contacts, no travels, no trauma.  AVSS, exam as noted, CTAB, RRR, abdomen soft NTND,

## 2019-10-06 NOTE — ED ADULT TRIAGE NOTE - CHIEF COMPLAINT QUOTE
"I have knee pain, I haven't slept in 3 days, I haven't taken opiates in 6 months with no substitute. Last time it was 12 days and they admitted me to psych." Patient denies SI/HI. "I have knee pain, I haven't slept in 3 days, I haven't taken opiates in 6 months with no substitute. Last time it was 12 days and they admitted me to psych. I went to [employer] 6 times." Patient denies SI/HI.

## 2019-10-06 NOTE — ED BEHAVIORAL HEALTH ASSESSMENT NOTE - EYE CONTACT
Fair Advancement-Rotation Flap Text: The defect edges were debeveled with a #15 scalpel blade.  Given the location of the defect, shape of the defect and the proximity to free margins an advancement-rotation flap was deemed most appropriate.  Using a sterile surgical marker, an appropriate flap was drawn incorporating the defect and placing the expected incisions within the relaxed skin tension lines where possible. The area thus outlined was incised deep to adipose tissue with a #15 scalpel blade.  The skin margins were undermined to an appropriate distance in all directions utilizing iris scissors.

## 2019-10-06 NOTE — ED PROVIDER NOTE - OBJECTIVE STATEMENT
Patient is a 35 yo M w/ hx of opoid abuse (6 months sobriety) unspecified psychiatric hx, was taking ambien, klonipin, Adderall prescribed by Dr. Noriega ([facility]) p/w inability to sleep x 3 days. Patient states he has not been taking his medication over the last 3 yrs; states his psychiatrist has increased his adderall dose but he is unhappy with his psychiatrist and wants to establish care on [place]. Patient states he had similar issues of not been able to sleep 2 weeks ago at which point he was admitted to psych facility in [place], given IM injections for "bugging out", patient states he was discharged without medication. Currently endorses hearing voices; states he is not sure who they are, they speak to each other and often tell him he is bad. Denies suicidal/homicidal ideation. Denies drug use.

## 2019-10-06 NOTE — ED ADULT NURSE NOTE - OBJECTIVE STATEMENT
Patient is a 35 yo male c/o insomnia x 3 days, patient has history of opoid abuse, and unspecific psy history.

## 2019-10-06 NOTE — ED PROVIDER NOTE - PROGRESS NOTE DETAILS
Patient was given 1mg PO ativan and was able to sleep comfortably throughout the night; not agitated, cooperative with staff. s/o Dr. Roes, f/u psych consult. Pt s/o to me by Dr. Fitzgerald to follow up psychiatry consult, reassess and dispo. Pt resting comfortably in NAD.  Will reassess.

## 2019-10-06 NOTE — ED PROVIDER NOTE - PATIENT PORTAL LINK FT
You can access the FollowMyHealth Patient Portal offered by [facility] by registering at the following website: [email or web address]. By joining [facility]’s FollowMyHealth portal, you will also be able to view your health information using other applications (apps) compatible with our system.

## 2019-10-06 NOTE — ED ADULT NURSE NOTE - CHIEF COMPLAINT QUOTE
"I have knee pain, I haven't slept in 3 days, I haven't taken opiates in 6 months with no substitute. Last time it was 12 days and they admitted me to psych. [facility] [employer] [facility]." Patient denies SI/HI.

## 2021-01-01 NOTE — CONSULT NOTE ADULT - OPHTHALMOLOGIC
Family education completed:No    Report given to: NEHA López     Time of transfer: 1700    Transferred to: [facility]     Belongings sent:Yes    Family updated:Yes    Reviewed pertinent information from EPIC (EMAR/Clinical Summary/Flowsheets):Yes    Head-to-toe assessment with receiving RN:Yes    Recommendations (e.g. Family needs/recent issues/things to watch for):      negative

## 2022-09-01 NOTE — ED BEHAVIORAL HEALTH ASSESSMENT NOTE - NS ED B[facility] REVIEW OF ED CHART AVAILABLE LABS REVIEWED
[de-identified] : No swelling/ecchymosis\par TTP over radial head\par FAROM with pain\par NVID\par 
Yes
